# Patient Record
Sex: FEMALE | Race: WHITE | NOT HISPANIC OR LATINO
[De-identification: names, ages, dates, MRNs, and addresses within clinical notes are randomized per-mention and may not be internally consistent; named-entity substitution may affect disease eponyms.]

---

## 2017-03-16 ENCOUNTER — APPOINTMENT (OUTPATIENT)
Dept: PULMONOLOGY | Facility: CLINIC | Age: 57
End: 2017-03-16

## 2017-03-16 VITALS
OXYGEN SATURATION: 94 % | HEIGHT: 62.5 IN | SYSTOLIC BLOOD PRESSURE: 124 MMHG | BODY MASS INDEX: 24.84 KG/M2 | DIASTOLIC BLOOD PRESSURE: 78 MMHG | HEART RATE: 78 BPM

## 2017-03-16 VITALS — WEIGHT: 138 LBS | BODY MASS INDEX: 24.84 KG/M2

## 2017-03-16 DIAGNOSIS — R91.8 OTHER NONSPECIFIC ABNORMAL FINDING OF LUNG FIELD: ICD-10-CM

## 2017-03-16 RX ORDER — AZITHROMYCIN 250 MG/1
250 TABLET, FILM COATED ORAL
Qty: 6 | Refills: 0 | Status: DISCONTINUED | COMMUNITY
Start: 2016-11-28 | End: 2017-03-16

## 2017-03-16 RX ORDER — VALSARTAN 160 MG/1
160 TABLET, COATED ORAL
Qty: 90 | Refills: 0 | Status: DISCONTINUED | COMMUNITY
Start: 2016-11-22 | End: 2017-03-16

## 2017-03-16 RX ORDER — BETAMETHASONE DIPROPIONATE 0.5 MG/G
0.05 OINTMENT, AUGMENTED TOPICAL
Qty: 50 | Refills: 0 | Status: DISCONTINUED | COMMUNITY
Start: 2017-01-26 | End: 2017-03-16

## 2017-04-13 ENCOUNTER — OUTPATIENT (OUTPATIENT)
Dept: OUTPATIENT SERVICES | Facility: HOSPITAL | Age: 57
LOS: 1 days | End: 2017-04-13
Payer: COMMERCIAL

## 2017-04-13 DIAGNOSIS — Z01.818 ENCOUNTER FOR OTHER PREPROCEDURAL EXAMINATION: ICD-10-CM

## 2017-04-13 LAB
ANION GAP SERPL CALC-SCNC: 13 MMOL/L — SIGNIFICANT CHANGE UP (ref 5–17)
BASOPHILS # BLD AUTO: 0 K/UL — SIGNIFICANT CHANGE UP (ref 0–0.2)
BASOPHILS NFR BLD AUTO: 1 % — SIGNIFICANT CHANGE UP (ref 0–2)
BUN SERPL-MCNC: 15 MG/DL — SIGNIFICANT CHANGE UP (ref 8–20)
CALCIUM SERPL-MCNC: 9.5 MG/DL — SIGNIFICANT CHANGE UP (ref 8.6–10.2)
CHLORIDE SERPL-SCNC: 104 MMOL/L — SIGNIFICANT CHANGE UP (ref 98–107)
CO2 SERPL-SCNC: 24 MMOL/L — SIGNIFICANT CHANGE UP (ref 22–29)
CREAT SERPL-MCNC: 0.73 MG/DL — SIGNIFICANT CHANGE UP (ref 0.5–1.3)
EOSINOPHIL # BLD AUTO: 0.1 K/UL — SIGNIFICANT CHANGE UP (ref 0–0.5)
EOSINOPHIL NFR BLD AUTO: 3 % — SIGNIFICANT CHANGE UP (ref 0–5)
GLUCOSE SERPL-MCNC: 108 MG/DL — SIGNIFICANT CHANGE UP (ref 70–115)
HCT VFR BLD CALC: 33.9 % — LOW (ref 37–47)
HGB BLD-MCNC: 11 G/DL — LOW (ref 12–16)
LYMPHOCYTES # BLD AUTO: 0.8 K/UL — LOW (ref 1–4.8)
LYMPHOCYTES # BLD AUTO: 17 % — LOW (ref 20–55)
MCHC RBC-ENTMCNC: 30.2 PG — SIGNIFICANT CHANGE UP (ref 27–31)
MCHC RBC-ENTMCNC: 32.4 G/DL — SIGNIFICANT CHANGE UP (ref 32–36)
MCV RBC AUTO: 93.1 FL — SIGNIFICANT CHANGE UP (ref 81–99)
MONOCYTES # BLD AUTO: 0.4 K/UL — SIGNIFICANT CHANGE UP (ref 0–0.8)
MONOCYTES NFR BLD AUTO: 1 % — LOW (ref 3–10)
MYELOCYTES NFR BLD: 1 % — HIGH (ref 0–0)
NEUTROPHILS # BLD AUTO: 3.8 K/UL — SIGNIFICANT CHANGE UP (ref 1.8–8)
NEUTROPHILS NFR BLD AUTO: 77 % — HIGH (ref 37–73)
PLAT MORPH BLD: NORMAL — SIGNIFICANT CHANGE UP
PLATELET # BLD AUTO: 262 K/UL — SIGNIFICANT CHANGE UP (ref 150–400)
POTASSIUM SERPL-MCNC: 4.4 MMOL/L — SIGNIFICANT CHANGE UP (ref 3.5–5.3)
POTASSIUM SERPL-SCNC: 4.4 MMOL/L — SIGNIFICANT CHANGE UP (ref 3.5–5.3)
RBC # BLD: 3.64 M/UL — LOW (ref 4.4–5.2)
RBC # FLD: 13.1 % — SIGNIFICANT CHANGE UP (ref 11–15.6)
RBC BLD AUTO: NORMAL — SIGNIFICANT CHANGE UP
SODIUM SERPL-SCNC: 141 MMOL/L — SIGNIFICANT CHANGE UP (ref 135–145)
WBC # BLD: 5 K/UL — SIGNIFICANT CHANGE UP (ref 4.8–10.8)
WBC # FLD AUTO: 5 K/UL — SIGNIFICANT CHANGE UP (ref 4.8–10.8)

## 2017-04-13 PROCEDURE — 80048 BASIC METABOLIC PNL TOTAL CA: CPT

## 2017-04-13 PROCEDURE — G0463: CPT

## 2017-04-13 PROCEDURE — 85027 COMPLETE CBC AUTOMATED: CPT

## 2017-04-14 DIAGNOSIS — Z01.818 ENCOUNTER FOR OTHER PREPROCEDURAL EXAMINATION: ICD-10-CM

## 2017-04-14 DIAGNOSIS — M79.673 PAIN IN UNSPECIFIED FOOT: ICD-10-CM

## 2017-07-06 ENCOUNTER — RX RENEWAL (OUTPATIENT)
Age: 57
End: 2017-07-06

## 2017-07-07 ENCOUNTER — MEDICATION RENEWAL (OUTPATIENT)
Age: 57
End: 2017-07-07

## 2017-08-24 ENCOUNTER — APPOINTMENT (OUTPATIENT)
Dept: FAMILY MEDICINE | Facility: CLINIC | Age: 57
End: 2017-08-24
Payer: COMMERCIAL

## 2017-08-24 ENCOUNTER — NON-APPOINTMENT (OUTPATIENT)
Age: 57
End: 2017-08-24

## 2017-08-24 VITALS
HEIGHT: 62 IN | BODY MASS INDEX: 26.13 KG/M2 | WEIGHT: 142 LBS | HEART RATE: 80 BPM | DIASTOLIC BLOOD PRESSURE: 78 MMHG | SYSTOLIC BLOOD PRESSURE: 118 MMHG

## 2017-08-24 DIAGNOSIS — Z83.42 FAMILY HISTORY OF FAMILIAL HYPERCHOLESTEROLEMIA: ICD-10-CM

## 2017-08-24 DIAGNOSIS — Z82.49 FAMILY HISTORY OF ISCHEMIC HEART DISEASE AND OTHER DISEASES OF THE CIRCULATORY SYSTEM: ICD-10-CM

## 2017-08-24 DIAGNOSIS — Z83.3 FAMILY HISTORY OF DIABETES MELLITUS: ICD-10-CM

## 2017-08-24 DIAGNOSIS — Z82.5 FAMILY HISTORY OF ASTHMA AND OTHER CHRONIC LOWER RESPIRATORY DISEASES: ICD-10-CM

## 2017-08-24 PROCEDURE — 99386 PREV VISIT NEW AGE 40-64: CPT | Mod: 25

## 2017-08-24 PROCEDURE — 93000 ELECTROCARDIOGRAM COMPLETE: CPT

## 2017-08-24 PROCEDURE — 99214 OFFICE O/P EST MOD 30 MIN: CPT | Mod: 25

## 2017-08-28 ENCOUNTER — LABORATORY RESULT (OUTPATIENT)
Age: 57
End: 2017-08-28

## 2017-09-13 ENCOUNTER — MEDICATION RENEWAL (OUTPATIENT)
Age: 57
End: 2017-09-13

## 2017-09-18 ENCOUNTER — APPOINTMENT (OUTPATIENT)
Dept: FAMILY MEDICINE | Facility: CLINIC | Age: 57
End: 2017-09-18
Payer: COMMERCIAL

## 2017-09-18 VITALS
WEIGHT: 138 LBS | OXYGEN SATURATION: 98 % | DIASTOLIC BLOOD PRESSURE: 80 MMHG | SYSTOLIC BLOOD PRESSURE: 120 MMHG | TEMPERATURE: 98.7 F | HEART RATE: 72 BPM | BODY MASS INDEX: 25.24 KG/M2

## 2017-09-18 LAB
25(OH)D3 SERPL-MCNC: 28 NG/ML
ALBUMIN SERPL ELPH-MCNC: 4.1 G/DL
ALP BLD-CCNC: 79 U/L
ALT SERPL-CCNC: 28 U/L
ANION GAP SERPL CALC-SCNC: 16 MMOL/L
APPEARANCE: CLEAR
AST SERPL-CCNC: 48 U/L
BASOPHILS # BLD AUTO: 0.02 K/UL
BASOPHILS NFR BLD AUTO: 0.4 %
BILIRUB SERPL-MCNC: 0.3 MG/DL
BILIRUBIN URINE: NEGATIVE
BLOOD URINE: NEGATIVE
BUN SERPL-MCNC: 16 MG/DL
CALCIUM SERPL-MCNC: 9.3 MG/DL
CHLORIDE SERPL-SCNC: 103 MMOL/L
CHOLEST SERPL-MCNC: 203 MG/DL
CHOLEST/HDLC SERPL: 4.3 RATIO
CO2 SERPL-SCNC: 22 MMOL/L
COLOR: YELLOW
CREAT SERPL-MCNC: 0.91 MG/DL
EOSINOPHIL # BLD AUTO: 0.15 K/UL
EOSINOPHIL NFR BLD AUTO: 3.3 %
GLUCOSE QUALITATIVE U: NORMAL MG/DL
GLUCOSE SERPL-MCNC: 85 MG/DL
HCT VFR BLD CALC: 37.4 %
HDLC SERPL-MCNC: 47 MG/DL
HEMOCCULT STL QL IA: NEGATIVE
HGB BLD-MCNC: 11.9 G/DL
IMM GRANULOCYTES NFR BLD AUTO: 0.4 %
KETONES URINE: NEGATIVE
LDLC SERPL CALC-MCNC: 93 MG/DL
LEUKOCYTE ESTERASE URINE: ABNORMAL
LYMPHOCYTES # BLD AUTO: 0.97 K/UL
LYMPHOCYTES NFR BLD AUTO: 21.1 %
MAN DIFF?: NORMAL
MCHC RBC-ENTMCNC: 29.5 PG
MCHC RBC-ENTMCNC: 31.8 GM/DL
MCV RBC AUTO: 92.6 FL
MONOCYTES # BLD AUTO: 0.46 K/UL
MONOCYTES NFR BLD AUTO: 10 %
NEUTROPHILS # BLD AUTO: 2.98 K/UL
NEUTROPHILS NFR BLD AUTO: 64.8 %
NITRITE URINE: NEGATIVE
PH URINE: 6
PLATELET # BLD AUTO: 225 K/UL
POTASSIUM SERPL-SCNC: 4.3 MMOL/L
PROT SERPL-MCNC: 7.4 G/DL
PROTEIN URINE: NEGATIVE MG/DL
RBC # BLD: 4.04 M/UL
RBC # FLD: 14 %
SODIUM SERPL-SCNC: 141 MMOL/L
SPECIFIC GRAVITY URINE: 1.01
T4 FREE SERPL-MCNC: 0.9 NG/DL
TRIGL SERPL-MCNC: 316 MG/DL
TSH SERPL-ACNC: 3.21 UIU/ML
UROBILINOGEN URINE: NORMAL MG/DL
WBC # FLD AUTO: 4.6 K/UL

## 2017-09-18 PROCEDURE — G0008: CPT

## 2017-09-18 PROCEDURE — 99214 OFFICE O/P EST MOD 30 MIN: CPT | Mod: 25

## 2017-09-18 PROCEDURE — 90686 IIV4 VACC NO PRSV 0.5 ML IM: CPT

## 2017-10-04 ENCOUNTER — APPOINTMENT (OUTPATIENT)
Dept: PULMONOLOGY | Facility: CLINIC | Age: 57
End: 2017-10-04
Payer: COMMERCIAL

## 2017-10-04 VITALS — OXYGEN SATURATION: 97 % | DIASTOLIC BLOOD PRESSURE: 78 MMHG | HEART RATE: 88 BPM | SYSTOLIC BLOOD PRESSURE: 130 MMHG

## 2017-10-04 VITALS — WEIGHT: 138 LBS | BODY MASS INDEX: 25.24 KG/M2

## 2017-10-04 PROCEDURE — 94010 BREATHING CAPACITY TEST: CPT

## 2017-10-04 PROCEDURE — 99214 OFFICE O/P EST MOD 30 MIN: CPT | Mod: 25

## 2017-10-24 ENCOUNTER — OTHER (OUTPATIENT)
Age: 57
End: 2017-10-24

## 2017-10-24 DIAGNOSIS — R92.8 OTHER ABNORMAL AND INCONCLUSIVE FINDINGS ON DIAGNOSTIC IMAGING OF BREAST: ICD-10-CM

## 2018-01-05 LAB
ALBUMIN SERPL ELPH-MCNC: 4 G/DL
ALP BLD-CCNC: 104 U/L
ALT SERPL-CCNC: 22 U/L
AST SERPL-CCNC: 34 U/L
BILIRUB DIRECT SERPL-MCNC: 0.1 MG/DL
BILIRUB INDIRECT SERPL-MCNC: 0.3 MG/DL
BILIRUB SERPL-MCNC: 0.4 MG/DL
HAV IGM SER QL: NONREACTIVE
HBV CORE IGM SER QL: NONREACTIVE
HBV SURFACE AG SER QL: NONREACTIVE
HCV AB SER QL: NONREACTIVE
HCV S/CO RATIO: 0.14 S/CO
PROT SERPL-MCNC: 6.7 G/DL

## 2018-02-14 ENCOUNTER — MEDICATION RENEWAL (OUTPATIENT)
Age: 58
End: 2018-02-14

## 2018-02-20 ENCOUNTER — MEDICATION RENEWAL (OUTPATIENT)
Age: 58
End: 2018-02-20

## 2018-03-19 ENCOUNTER — APPOINTMENT (OUTPATIENT)
Dept: FAMILY MEDICINE | Facility: CLINIC | Age: 58
End: 2018-03-19
Payer: COMMERCIAL

## 2018-03-19 VITALS
HEIGHT: 64 IN | BODY MASS INDEX: 23.47 KG/M2 | HEART RATE: 89 BPM | DIASTOLIC BLOOD PRESSURE: 82 MMHG | SYSTOLIC BLOOD PRESSURE: 130 MMHG | WEIGHT: 137.5 LBS

## 2018-03-19 PROCEDURE — 99214 OFFICE O/P EST MOD 30 MIN: CPT

## 2018-03-20 LAB
ALBUMIN SERPL ELPH-MCNC: 4.5 G/DL
ALP BLD-CCNC: 100 U/L
ALT SERPL-CCNC: 19 U/L
ANION GAP SERPL CALC-SCNC: 17 MMOL/L
AST SERPL-CCNC: 27 U/L
BILIRUB SERPL-MCNC: 0.4 MG/DL
BUN SERPL-MCNC: 23 MG/DL
CALCIUM SERPL-MCNC: 9.7 MG/DL
CHLORIDE SERPL-SCNC: 102 MMOL/L
CO2 SERPL-SCNC: 22 MMOL/L
CREAT SERPL-MCNC: 0.86 MG/DL
GLUCOSE SERPL-MCNC: 95 MG/DL
POTASSIUM SERPL-SCNC: 4.5 MMOL/L
PROT SERPL-MCNC: 7.1 G/DL
SODIUM SERPL-SCNC: 141 MMOL/L

## 2018-04-03 ENCOUNTER — OTHER (OUTPATIENT)
Age: 58
End: 2018-04-03

## 2018-04-03 LAB
AMYLASE/CREAT SERPL: 62 U/L
BACTERIA STL CULT: NORMAL
BASOPHILS # BLD AUTO: 0.01 K/UL
BASOPHILS NFR BLD AUTO: 0.2 %
C DIFF TOX GENS STL QL NAA+PROBE: NORMAL
CDIFF BY PCR: NOT DETECTED
DEPRECATED O AND P PREP STL: ABNORMAL
ENDOMYSIUM IGA SER QL: NEGATIVE
ENDOMYSIUM IGA TITR SER: NORMAL
EOSINOPHIL # BLD AUTO: 0.08 K/UL
EOSINOPHIL NFR BLD AUTO: 1.3 %
GLIADIN IGA SER QL: <5 UNITS
GLIADIN IGG SER QL: <5 UNITS
GLIADIN PEPTIDE IGA SER-ACNC: NEGATIVE
GLIADIN PEPTIDE IGG SER-ACNC: NEGATIVE
HCT VFR BLD CALC: 35.8 %
HEMOCCULT STL QL IA: NEGATIVE
HGB BLD-MCNC: 11.6 G/DL
IMM GRANULOCYTES NFR BLD AUTO: 0.2 %
LPL SERPL-CCNC: 65 U/L
LYMPHOCYTES # BLD AUTO: 1 K/UL
LYMPHOCYTES NFR BLD AUTO: 16.4 %
MAN DIFF?: NORMAL
MCHC RBC-ENTMCNC: 30 PG
MCHC RBC-ENTMCNC: 32.4 GM/DL
MCV RBC AUTO: 92.5 FL
MONOCYTES # BLD AUTO: 0.52 K/UL
MONOCYTES NFR BLD AUTO: 8.5 %
NEUTROPHILS # BLD AUTO: 4.47 K/UL
NEUTROPHILS NFR BLD AUTO: 73.4 %
PLATELET # BLD AUTO: 215 K/UL
RBC # BLD: 3.87 M/UL
RBC # FLD: 13 %
TTG IGA SER IA-ACNC: <5 UNITS
TTG IGA SER-ACNC: NEGATIVE
TTG IGG SER IA-ACNC: <5 UNITS
TTG IGG SER IA-ACNC: NEGATIVE
WBC # FLD AUTO: 6.09 K/UL

## 2018-04-26 ENCOUNTER — APPOINTMENT (OUTPATIENT)
Dept: GASTROENTEROLOGY | Facility: CLINIC | Age: 58
End: 2018-04-26
Payer: COMMERCIAL

## 2018-04-26 VITALS
DIASTOLIC BLOOD PRESSURE: 92 MMHG | BODY MASS INDEX: 23.39 KG/M2 | RESPIRATION RATE: 16 BRPM | OXYGEN SATURATION: 98 % | SYSTOLIC BLOOD PRESSURE: 153 MMHG | WEIGHT: 137 LBS | HEIGHT: 64 IN | HEART RATE: 92 BPM

## 2018-04-26 PROCEDURE — 99204 OFFICE O/P NEW MOD 45 MIN: CPT

## 2018-04-26 PROCEDURE — 82270 OCCULT BLOOD FECES: CPT

## 2018-05-31 ENCOUNTER — APPOINTMENT (OUTPATIENT)
Dept: PULMONOLOGY | Facility: CLINIC | Age: 58
End: 2018-05-31
Payer: COMMERCIAL

## 2018-05-31 VITALS
WEIGHT: 134 LBS | OXYGEN SATURATION: 98 % | SYSTOLIC BLOOD PRESSURE: 120 MMHG | BODY MASS INDEX: 22.88 KG/M2 | HEART RATE: 91 BPM | DIASTOLIC BLOOD PRESSURE: 80 MMHG | HEIGHT: 64 IN

## 2018-05-31 PROCEDURE — 99215 OFFICE O/P EST HI 40 MIN: CPT | Mod: 25

## 2018-06-14 ENCOUNTER — APPOINTMENT (OUTPATIENT)
Dept: GASTROENTEROLOGY | Facility: GI CENTER | Age: 58
End: 2018-06-14
Payer: COMMERCIAL

## 2018-06-14 ENCOUNTER — RESULT REVIEW (OUTPATIENT)
Age: 58
End: 2018-06-14

## 2018-06-14 ENCOUNTER — OUTPATIENT (OUTPATIENT)
Dept: OUTPATIENT SERVICES | Facility: HOSPITAL | Age: 58
LOS: 1 days | End: 2018-06-14
Payer: COMMERCIAL

## 2018-06-14 DIAGNOSIS — Z86.010 PERSONAL HISTORY OF COLONIC POLYPS: ICD-10-CM

## 2018-06-14 PROCEDURE — 45380 COLONOSCOPY AND BIOPSY: CPT

## 2018-06-14 PROCEDURE — 88305 TISSUE EXAM BY PATHOLOGIST: CPT | Mod: 26

## 2018-06-14 PROCEDURE — 88305 TISSUE EXAM BY PATHOLOGIST: CPT

## 2018-06-18 LAB — SURGICAL PATHOLOGY FINAL REPORT - CH: SIGNIFICANT CHANGE UP

## 2018-06-27 ENCOUNTER — APPOINTMENT (OUTPATIENT)
Dept: FAMILY MEDICINE | Facility: CLINIC | Age: 58
End: 2018-06-27
Payer: COMMERCIAL

## 2018-06-27 VITALS
HEART RATE: 89 BPM | SYSTOLIC BLOOD PRESSURE: 130 MMHG | TEMPERATURE: 98.3 F | HEIGHT: 64 IN | BODY MASS INDEX: 22.41 KG/M2 | OXYGEN SATURATION: 98 % | WEIGHT: 131.25 LBS | DIASTOLIC BLOOD PRESSURE: 76 MMHG

## 2018-06-27 PROCEDURE — 99213 OFFICE O/P EST LOW 20 MIN: CPT

## 2018-06-30 NOTE — PLAN
[FreeTextEntry1] : LIKELY VIRAL\par SUPPORTIVE CARE: REST, FLUIDS, STEAM\par NASAL SPRAY - REVIEWED USE\par BRAT DIET - TO ADVANCE AS TOLERATED\par CALL WITH ANY QUESTIONS, CONCERNS OR CHANGES OR IF SYMPTOMS PERSIST/WORSEN\par FOLLOW-UP FOR ROUTINE CARE\par

## 2018-06-30 NOTE — PHYSICAL EXAM
[No Acute Distress] : no acute distress [Well Nourished] : well nourished [Well-Appearing] : well-appearing [Normal Outer Ear/Nose] : the outer ears and nose were normal in appearance [Normal Oropharynx] : the oropharynx was normal [Normal TMs] : both tympanic membranes were normal [Supple] : supple [No Lymphadenopathy] : no lymphadenopathy [No Respiratory Distress] : no respiratory distress  [Clear to Auscultation] : lungs were clear to auscultation bilaterally [No Accessory Muscle Use] : no accessory muscle use [Normal Rate] : normal rate  [Regular Rhythm] : with a regular rhythm [Normal S1, S2] : normal S1 and S2 [Soft] : abdomen soft [Non Tender] : non-tender [Normal Bowel Sounds] : normal bowel sounds

## 2018-06-30 NOTE — HISTORY OF PRESENT ILLNESS
[FreeTextEntry8] : PRESENTING FOR ACUTE VISIT.  HAS HAD SYMPTOMS FOR THE LAST COUPLE DAYS.  CONGESTION AND POSTNASAL DRIP.  NO SORE THROAT.  DIARRHEA.  COUGH YESTERDAY BETTER TODAY.  NO ABDOMINAL PAIN.  NO N/V.  NO BLOOD IN STOOL.  NO FEVER.  NO SHORTNESS OF BREATH OR WHEEZING.  NO CHEST PAIN OR SYNCOPE.  NO KNOWN SICK CONTACTS.  TODAY TOOK PEPTOBISMOL.  IS NOT WORSENING.  NO OTHER COMPLAINTS TODAY.

## 2018-06-30 NOTE — REVIEW OF SYSTEMS
[Fatigue] : fatigue [Nasal Discharge] : nasal discharge [Postnasal Drip] : postnasal drip [Diarrhea] : diarrhea [Fever] : no fever [Earache] : no earache [Chest Pain] : no chest pain [Palpitations] : no palpitations [Shortness Of Breath] : no shortness of breath [Wheezing] : no wheezing

## 2018-07-09 ENCOUNTER — APPOINTMENT (OUTPATIENT)
Dept: SURGERY | Facility: CLINIC | Age: 58
End: 2018-07-09
Payer: COMMERCIAL

## 2018-07-09 VITALS
TEMPERATURE: 98.2 F | DIASTOLIC BLOOD PRESSURE: 94 MMHG | OXYGEN SATURATION: 99 % | HEART RATE: 75 BPM | SYSTOLIC BLOOD PRESSURE: 164 MMHG | HEIGHT: 64 IN | BODY MASS INDEX: 22.88 KG/M2 | WEIGHT: 134 LBS

## 2018-07-09 PROCEDURE — 99244 OFF/OP CNSLTJ NEW/EST MOD 40: CPT

## 2018-08-02 ENCOUNTER — MEDICATION RENEWAL (OUTPATIENT)
Age: 58
End: 2018-08-02

## 2018-08-07 ENCOUNTER — RX RENEWAL (OUTPATIENT)
Age: 58
End: 2018-08-07

## 2018-08-14 ENCOUNTER — RX RENEWAL (OUTPATIENT)
Age: 58
End: 2018-08-14

## 2018-08-27 ENCOUNTER — OUTPATIENT (OUTPATIENT)
Dept: OUTPATIENT SERVICES | Facility: HOSPITAL | Age: 58
LOS: 1 days | End: 2018-08-27
Payer: COMMERCIAL

## 2018-08-27 DIAGNOSIS — Z01.818 ENCOUNTER FOR OTHER PREPROCEDURAL EXAMINATION: ICD-10-CM

## 2018-08-27 LAB
ANION GAP SERPL CALC-SCNC: 15 MMOL/L — SIGNIFICANT CHANGE UP (ref 5–17)
BASOPHILS # BLD AUTO: 0 K/UL — SIGNIFICANT CHANGE UP (ref 0–0.2)
BASOPHILS NFR BLD AUTO: 0.6 % — SIGNIFICANT CHANGE UP (ref 0–2)
BUN SERPL-MCNC: 22 MG/DL — HIGH (ref 8–20)
CALCIUM SERPL-MCNC: 9.9 MG/DL — SIGNIFICANT CHANGE UP (ref 8.6–10.2)
CHLORIDE SERPL-SCNC: 100 MMOL/L — SIGNIFICANT CHANGE UP (ref 98–107)
CO2 SERPL-SCNC: 23 MMOL/L — SIGNIFICANT CHANGE UP (ref 22–29)
CREAT SERPL-MCNC: 0.95 MG/DL — SIGNIFICANT CHANGE UP (ref 0.5–1.3)
EOSINOPHIL # BLD AUTO: 0.1 K/UL — SIGNIFICANT CHANGE UP (ref 0–0.5)
EOSINOPHIL NFR BLD AUTO: 2.6 % — SIGNIFICANT CHANGE UP (ref 0–5)
GLUCOSE SERPL-MCNC: 100 MG/DL — SIGNIFICANT CHANGE UP (ref 70–115)
HCT VFR BLD CALC: 35.7 % — LOW (ref 37–47)
HGB BLD-MCNC: 11.5 G/DL — LOW (ref 12–16)
LYMPHOCYTES # BLD AUTO: 0.8 K/UL — LOW (ref 1–4.8)
LYMPHOCYTES # BLD AUTO: 23.6 % — SIGNIFICANT CHANGE UP (ref 20–55)
MCHC RBC-ENTMCNC: 30 PG — SIGNIFICANT CHANGE UP (ref 27–31)
MCHC RBC-ENTMCNC: 32.2 G/DL — SIGNIFICANT CHANGE UP (ref 32–36)
MCV RBC AUTO: 93.2 FL — SIGNIFICANT CHANGE UP (ref 81–99)
MONOCYTES # BLD AUTO: 0.4 K/UL — SIGNIFICANT CHANGE UP (ref 0–0.8)
MONOCYTES NFR BLD AUTO: 12 % — HIGH (ref 3–10)
NEUTROPHILS # BLD AUTO: 2.1 K/UL — SIGNIFICANT CHANGE UP (ref 1.8–8)
NEUTROPHILS NFR BLD AUTO: 60.9 % — SIGNIFICANT CHANGE UP (ref 37–73)
PLAT MORPH BLD: NORMAL — SIGNIFICANT CHANGE UP
PLATELET # BLD AUTO: 192 K/UL — SIGNIFICANT CHANGE UP (ref 150–400)
POTASSIUM SERPL-MCNC: 4.5 MMOL/L — SIGNIFICANT CHANGE UP (ref 3.5–5.3)
POTASSIUM SERPL-SCNC: 4.5 MMOL/L — SIGNIFICANT CHANGE UP (ref 3.5–5.3)
RBC # BLD: 3.83 M/UL — LOW (ref 4.4–5.2)
RBC # FLD: 13 % — SIGNIFICANT CHANGE UP (ref 11–15.6)
RBC BLD AUTO: NORMAL — SIGNIFICANT CHANGE UP
SODIUM SERPL-SCNC: 138 MMOL/L — SIGNIFICANT CHANGE UP (ref 135–145)
WBC # BLD: 3.4 K/UL — LOW (ref 4.8–10.8)
WBC # FLD AUTO: 3.4 K/UL — LOW (ref 4.8–10.8)

## 2018-08-27 PROCEDURE — 93010 ELECTROCARDIOGRAM REPORT: CPT

## 2018-08-27 PROCEDURE — G0463: CPT

## 2018-08-27 PROCEDURE — 36415 COLL VENOUS BLD VENIPUNCTURE: CPT

## 2018-08-27 PROCEDURE — 85027 COMPLETE CBC AUTOMATED: CPT

## 2018-08-27 PROCEDURE — 80048 BASIC METABOLIC PNL TOTAL CA: CPT

## 2018-08-27 PROCEDURE — 93005 ELECTROCARDIOGRAM TRACING: CPT

## 2018-08-30 ENCOUNTER — APPOINTMENT (OUTPATIENT)
Dept: FAMILY MEDICINE | Facility: CLINIC | Age: 58
End: 2018-08-30
Payer: COMMERCIAL

## 2018-08-30 VITALS
SYSTOLIC BLOOD PRESSURE: 120 MMHG | HEART RATE: 83 BPM | WEIGHT: 133 LBS | OXYGEN SATURATION: 98 % | TEMPERATURE: 97.9 F | BODY MASS INDEX: 22.71 KG/M2 | HEIGHT: 64 IN | DIASTOLIC BLOOD PRESSURE: 60 MMHG

## 2018-08-30 DIAGNOSIS — Z86.19 PERSONAL HISTORY OF OTHER INFECTIOUS AND PARASITIC DISEASES: ICD-10-CM

## 2018-08-30 PROCEDURE — 99215 OFFICE O/P EST HI 40 MIN: CPT

## 2018-08-30 NOTE — PLAN
[FreeTextEntry1] : OPTIMIZED MEDICALLY FOR PROPOSED SURGICAL PROCEDURE \par \par PRE-OPERATIVE TESTING REVIEWED\par SEE CARDIOLOGY CLEARANCE\par STATES SHE WAS ALSO ADVISED TO HAVE PULMONARY CLEARANCE AS WELL \par GI/DVT PROPHYLAXIS PER SURGERY\par AVOIDANCE OF NSAIDS, VITAMINS AND ASPIRIN CONTAINING PRODUCTS PRIOR TO SURGERY\par RX FOR NEXT BREAST IMAGING\par RECHECK CBC POST PROCEDURE\par BLOOD PRESSURE CONTROLLED\par FLU VACCINE IN THE FALL RECOMMENDED\par ALSO DISCUSSED TDAP AND SHINGLES VACCINES\par ROUTINE GYN FOLLOW-UP\par CALL WITH ANY QUESTIONS, CONCERNS OR CHANGES PRIOR TO PROCEDURE\par SHEFALI TO CALL WITH UPDATED BLOOD PRESSURE MEDICATION.  STATES THAT IT WAS CHANGED BY CARDIOLOGY BUT WILL NEED TO CALL BACK WITH CORRECT NAME AND DOSAGE.\par SUPPORT PROVIDED\par FOLLOW-UP POST-OPERATIVELY

## 2018-08-30 NOTE — REVIEW OF SYSTEMS
[Fever] : no fever [Chills] : no chills [Fatigue] : no fatigue [Discharge] : no discharge [Pain] : no pain [Earache] : no earache [Nasal Discharge] : no nasal discharge [Sore Throat] : no sore throat [Chest Pain] : no chest pain [Palpitations] : no palpitations [Shortness Of Breath] : no shortness of breath [Cough] : no cough [Abdominal Pain] : no abdominal pain [Nausea] : no nausea [Diarrhea] : diarrhea [Vomiting] : no vomiting [Melena] : no melena [Dysuria] : no dysuria [Hematuria] : no hematuria [Joint Pain] : no joint pain [Muscle Pain] : no muscle pain [Itching] : no itching [Skin Rash] : no skin rash [Headache] : no headache [Dizziness] : no dizziness [Fainting] : no fainting [Suicidal] : not suicidal [Depression] : no depression [Easy Bleeding] : no easy bleeding [Easy Bruising] : no easy bruising

## 2018-08-30 NOTE — HISTORY OF PRESENT ILLNESS
[COPD] : COPD [(Patient denies any chest pain, claudication, dyspnea on exertion, orthopnea, palpitations or syncope)] : Patient denies any chest pain, claudication, dyspnea on exertion, orthopnea, palpitations or syncope [Aortic Stenosis] : no aortic stenosis [Atrial Fibrillation] : no atrial fibrillation [Coronary Artery Disease] : no coronary artery disease [Recent Myocardial Infarction] : no recent myocardial infarction [Implantable Device/Pacemaker] : no implantable device/pacemaker [Asthma] : no asthma [Sleep Apnea] : no sleep apnea [Smoker] : not a smoker [Family Member] : no family member with adverse anesthesia reaction/sudden death [Self] : no previous adverse anesthesia reaction [Chronic Anticoagulation] : no chronic anticoagulation [Chronic Kidney Disease] : no chronic kidney disease [Diabetes] : no diabetes [FreeTextEntry1] : UMBILICAL HERNIA REPAIR [FreeTextEntry2] : SEPTEMBER 4, 2018 [FreeTextEntry3] : DR. ARRIAZA [FreeTextEntry4] : PRESENTING FOR MEDICAL CLEARANCE FOR UPCOMING UMBILICAL HERNIA REPAIR SURGERY.  HAS HAD FOR YEARS.  SOMETIMES CAUSES DISCOMFORT.  FEELING WELL TODAY.  NO HISTORY OF MI, STROKE OR SEIZURE.  NO PERSONAL OR FAMILY HISTORY OF BLOOD DISORDERS.  DENIES EASY BLEEDING OR BRUISING.  IS ABLE TO WALK MULTIPLE BLOCKS AND GO UP MULTIPLE FLIGHTS OF STAIRS WITHOUT DIFFICULTY.   HAS HAD NO HEADACHE, DIZZINESS, CHEST PAIN, SHORTNESS OF BREATH, GI OR URINARY COMPLAINTS.  NO OTHER COMPLAINTS TODAY.  CHRONIC HISTORY OF HYPERTENSION, HYPERLIPIDEMIA, COPD, CAROTID DISEASE AND VITAMIN D INSUFFICIENCY.  BLOOD PRESSURE CONTROLLED ON CURRENT MEDICATIONS.  ON LIPITOR FOR CHOLESTEROL.  WILL BE DUE FOR 6 MONTH BREAST IMAGING FOLLOW-UP.  HAS NO BREAST COMPLAINTS.  NO PAIN, NIPPLE DISCHARGE, SKIN CHANGES OR LUMPS.  ADVISED TO SEE CARDIOLOGY AND PULMONOLOGY FOR CLEARANCE. [FreeTextEntry5] : HYPERTENSION, HYPERLIPDIEMIA, CAROTID ARTERY DISEASE [FreeTextEntry7] : SAW CARDIOLOGY DR. VELASCO FOR CLEARANCE\par WAS ADVISED SHE NEEDED PULMONOLOGY CLEARANCE AS WELL

## 2018-08-31 ENCOUNTER — APPOINTMENT (OUTPATIENT)
Dept: PULMONOLOGY | Facility: CLINIC | Age: 58
End: 2018-08-31
Payer: COMMERCIAL

## 2018-08-31 VITALS
RESPIRATION RATE: 16 BRPM | HEART RATE: 85 BPM | DIASTOLIC BLOOD PRESSURE: 72 MMHG | OXYGEN SATURATION: 97 % | WEIGHT: 132 LBS | SYSTOLIC BLOOD PRESSURE: 122 MMHG | BODY MASS INDEX: 22.66 KG/M2

## 2018-08-31 PROCEDURE — 99214 OFFICE O/P EST MOD 30 MIN: CPT | Mod: 25

## 2018-08-31 PROCEDURE — 94010 BREATHING CAPACITY TEST: CPT

## 2018-08-31 RX ORDER — SODIUM SULFATE, POTASSIUM SULFATE, MAGNESIUM SULFATE 17.5; 3.13; 1.6 G/ML; G/ML; G/ML
17.5-3.13-1.6 SOLUTION, CONCENTRATE ORAL
Qty: 1 | Refills: 0 | Status: DISCONTINUED | COMMUNITY
Start: 2018-04-26 | End: 2018-08-31

## 2018-08-31 RX ORDER — VALSARTAN 80 MG/1
80 TABLET, COATED ORAL DAILY
Qty: 90 | Refills: 1 | Status: DISCONTINUED | COMMUNITY
Start: 2016-12-09 | End: 2018-08-31

## 2018-09-04 PROCEDURE — 49585: CPT

## 2018-09-07 ENCOUNTER — APPOINTMENT (OUTPATIENT)
Dept: FAMILY MEDICINE | Facility: CLINIC | Age: 58
End: 2018-09-07

## 2018-09-10 ENCOUNTER — APPOINTMENT (OUTPATIENT)
Dept: SURGERY | Facility: CLINIC | Age: 58
End: 2018-09-10
Payer: COMMERCIAL

## 2018-09-10 VITALS
HEART RATE: 100 BPM | TEMPERATURE: 97.7 F | DIASTOLIC BLOOD PRESSURE: 90 MMHG | BODY MASS INDEX: 22.66 KG/M2 | SYSTOLIC BLOOD PRESSURE: 142 MMHG | OXYGEN SATURATION: 98 % | WEIGHT: 132 LBS

## 2018-09-10 PROCEDURE — 99024 POSTOP FOLLOW-UP VISIT: CPT

## 2018-09-28 ENCOUNTER — APPOINTMENT (OUTPATIENT)
Dept: SURGERY | Facility: CLINIC | Age: 58
End: 2018-09-28
Payer: COMMERCIAL

## 2018-09-28 VITALS
RESPIRATION RATE: 16 BRPM | SYSTOLIC BLOOD PRESSURE: 97 MMHG | TEMPERATURE: 98.5 F | OXYGEN SATURATION: 96 % | BODY MASS INDEX: 22.71 KG/M2 | HEIGHT: 64 IN | DIASTOLIC BLOOD PRESSURE: 60 MMHG | WEIGHT: 133 LBS | HEART RATE: 81 BPM

## 2018-09-28 VITALS
SYSTOLIC BLOOD PRESSURE: 97 MMHG | WEIGHT: 133 LBS | HEART RATE: 81 BPM | RESPIRATION RATE: 16 BRPM | BODY MASS INDEX: 22.71 KG/M2 | DIASTOLIC BLOOD PRESSURE: 60 MMHG | TEMPERATURE: 98.5 F | OXYGEN SATURATION: 96 % | HEIGHT: 64 IN

## 2018-09-28 PROCEDURE — 99024 POSTOP FOLLOW-UP VISIT: CPT

## 2018-10-03 ENCOUNTER — RX RENEWAL (OUTPATIENT)
Age: 58
End: 2018-10-03

## 2018-10-12 ENCOUNTER — TRANSCRIPTION ENCOUNTER (OUTPATIENT)
Age: 58
End: 2018-10-12

## 2018-12-05 ENCOUNTER — APPOINTMENT (OUTPATIENT)
Dept: PULMONOLOGY | Facility: CLINIC | Age: 58
End: 2018-12-05
Payer: COMMERCIAL

## 2018-12-05 VITALS — WEIGHT: 137 LBS | HEIGHT: 64 IN | BODY MASS INDEX: 23.39 KG/M2

## 2018-12-05 VITALS
HEART RATE: 78 BPM | OXYGEN SATURATION: 98 % | SYSTOLIC BLOOD PRESSURE: 122 MMHG | RESPIRATION RATE: 16 BRPM | DIASTOLIC BLOOD PRESSURE: 80 MMHG

## 2018-12-05 PROCEDURE — 94010 BREATHING CAPACITY TEST: CPT

## 2018-12-05 PROCEDURE — 99214 OFFICE O/P EST MOD 30 MIN: CPT | Mod: 25

## 2018-12-28 ENCOUNTER — OTHER (OUTPATIENT)
Age: 58
End: 2018-12-28

## 2019-03-03 ENCOUNTER — RX RENEWAL (OUTPATIENT)
Age: 59
End: 2019-03-03

## 2019-03-04 ENCOUNTER — APPOINTMENT (OUTPATIENT)
Dept: FAMILY MEDICINE | Facility: CLINIC | Age: 59
End: 2019-03-04
Payer: COMMERCIAL

## 2019-03-04 VITALS
DIASTOLIC BLOOD PRESSURE: 78 MMHG | HEART RATE: 80 BPM | HEIGHT: 64 IN | WEIGHT: 132 LBS | SYSTOLIC BLOOD PRESSURE: 120 MMHG | BODY MASS INDEX: 22.53 KG/M2

## 2019-03-04 DIAGNOSIS — R19.5 OTHER FECAL ABNORMALITIES: ICD-10-CM

## 2019-03-04 DIAGNOSIS — Z92.29 PERSONAL HISTORY OF OTHER DRUG THERAPY: ICD-10-CM

## 2019-03-04 DIAGNOSIS — R92.8 OTHER ABNORMAL AND INCONCLUSIVE FINDINGS ON DIAGNOSTIC IMAGING OF BREAST: ICD-10-CM

## 2019-03-04 PROCEDURE — 99214 OFFICE O/P EST MOD 30 MIN: CPT

## 2019-03-04 RX ORDER — OLMESARTAN MEDOXOMIL 5 MG/1
5 TABLET, FILM COATED ORAL DAILY
Qty: 90 | Refills: 1 | Status: DISCONTINUED | COMMUNITY
End: 2019-03-04

## 2019-03-10 PROBLEM — R92.8 ABNORMAL FINDING ON BREAST IMAGING: Status: ACTIVE | Noted: 2017-10-24

## 2019-03-10 PROBLEM — R19.5 LOOSE STOOLS: Status: RESOLVED | Noted: 2018-03-19 | Resolved: 2019-03-10

## 2019-03-10 PROBLEM — Z92.29 HISTORY OF INFLUENZA VACCINATION: Status: RESOLVED | Noted: 2017-09-18 | Resolved: 2019-03-10

## 2019-03-10 NOTE — ADDENDUM
[FreeTextEntry1] : I, Munira Jorge, acted solely as a scribe for Dr. Amanda Zuluaga on this date 3/4/19.

## 2019-03-10 NOTE — PHYSICAL EXAM
[No Acute Distress] : no acute distress [Well Nourished] : well nourished [Well-Appearing] : well-appearing [Supple] : supple [No Lymphadenopathy] : no lymphadenopathy [No Respiratory Distress] : no respiratory distress  [Clear to Auscultation] : lungs were clear to auscultation bilaterally [No Accessory Muscle Use] : no accessory muscle use [Normal Rate] : normal rate  [Regular Rhythm] : with a regular rhythm [Normal S1, S2] : normal S1 and S2 [No Murmur] : no murmur heard [Pedal Pulses Present] : the pedal pulses are present [No Edema] : there was no peripheral edema [Soft] : abdomen soft [Non Tender] : non-tender [Normal Bowel Sounds] : normal bowel sounds [No Joint Swelling] : no joint swelling [Grossly Normal Strength/Tone] : grossly normal strength/tone [Speech Grossly Normal] : speech grossly normal [Normal Mood] : the mood was normal [Normal Insight/Judgement] : insight and judgment were intact

## 2019-03-10 NOTE — HISTORY OF PRESENT ILLNESS
[FreeTextEntry1] : F/U HTN, HYPERLIPIDEMIA, VITAMIN D INSUFFICIENCY  [de-identified] : MS. CANTOR IS A VERY PLEASANT 59 YO PRESENTING FOR FOLLOW UP. S/P UMBILICAL HERNIA REPAIR. CHRONIC HISTORY OF HYPERTENSION, HYPERLIPIDEMIA, COPD, CAROTID DISEASE AND VITAMIN D INSUFFICIENCY. BLOOD PRESSURE CONTROLLED ON CURRENT MEDICATIONS. ON LIPITOR FOR CHOLESTEROL FOLLOWS WITH PULMONOLOGY FOR COPD. HAS HAD REPEAT BREAST IMAGING, ADVISED TO FOLLOW UP AGAIN. HAS NOT YET SEEN BREAST SURGEON. NO BREAST COMPLAINTS TODAY.

## 2019-03-10 NOTE — PLAN
[FreeTextEntry1] : RX FOR FOLLOW-UP BREAST IMAGING\par RE-ADVISED BREAST SURGEON EVALUATION - DR. ALMANZAR\par HEALTHY DIET AND LIFESTYLE MODIFICATIONS\par BLOOD PRESSURE CONTROLLED\par MONITOR LAB WORK INCLUDING LIPIDS AND VITAMIN D LEVELS\par FOLLOW-UP ALL SPECIALISTS AS DIRECTED \par ROUTINE GYN EXAMS\par CONSIDER SHINGRIX\par CALL WITH ANY QUESTIONS, CONCERNS OR CHANGES

## 2019-03-12 ENCOUNTER — LABORATORY RESULT (OUTPATIENT)
Age: 59
End: 2019-03-12

## 2019-03-18 ENCOUNTER — RX RENEWAL (OUTPATIENT)
Age: 59
End: 2019-03-18

## 2019-03-19 ENCOUNTER — TRANSCRIPTION ENCOUNTER (OUTPATIENT)
Age: 59
End: 2019-03-19

## 2019-03-27 LAB
25(OH)D3 SERPL-MCNC: 40.3 NG/ML
ALBUMIN SERPL ELPH-MCNC: 4.7 G/DL
ALP BLD-CCNC: 130 U/L
ALT SERPL-CCNC: 24 U/L
ANION GAP SERPL CALC-SCNC: 15 MMOL/L
APPEARANCE: CLEAR
AST SERPL-CCNC: 24 U/L
BILIRUB SERPL-MCNC: 0.4 MG/DL
BILIRUBIN URINE: NEGATIVE
BLOOD URINE: NEGATIVE
BUN SERPL-MCNC: 24 MG/DL
CALCIUM SERPL-MCNC: 9.9 MG/DL
CHLORIDE SERPL-SCNC: 105 MMOL/L
CHOLEST SERPL-MCNC: 191 MG/DL
CHOLEST/HDLC SERPL: 3.3 RATIO
CO2 SERPL-SCNC: 22 MMOL/L
COLOR: YELLOW
CREAT SERPL-MCNC: 1.04 MG/DL
GLUCOSE QUALITATIVE U: NEGATIVE
GLUCOSE SERPL-MCNC: 95 MG/DL
HDLC SERPL-MCNC: 58 MG/DL
KETONES URINE: NEGATIVE
LDLC SERPL CALC-MCNC: 91 MG/DL
LEUKOCYTE ESTERASE URINE: ABNORMAL
NITRITE URINE: NEGATIVE
PH URINE: 6
POTASSIUM SERPL-SCNC: 4 MMOL/L
PROT SERPL-MCNC: 7.3 G/DL
PROTEIN URINE: NORMAL
SODIUM SERPL-SCNC: 142 MMOL/L
SPECIFIC GRAVITY URINE: 1.02
T4 FREE SERPL-MCNC: 1.3 NG/DL
TRIGL SERPL-MCNC: 208 MG/DL
TSH SERPL-ACNC: 5.2 UIU/ML
UROBILINOGEN URINE: NORMAL

## 2019-03-28 ENCOUNTER — RESULT REVIEW (OUTPATIENT)
Age: 59
End: 2019-03-28

## 2019-03-28 LAB
BASOPHILS # BLD AUTO: 0.03 K/UL
BASOPHILS NFR BLD AUTO: 0.8 %
EOSINOPHIL # BLD AUTO: 0.12 K/UL
EOSINOPHIL NFR BLD AUTO: 3.1 %
HCT VFR BLD CALC: 37.4 %
HGB BLD-MCNC: 11.9 G/DL
IMM GRANULOCYTES NFR BLD AUTO: 0.3 %
LYMPHOCYTES # BLD AUTO: 0.97 K/UL
LYMPHOCYTES NFR BLD AUTO: 24.9 %
MAN DIFF?: NORMAL
MCHC RBC-ENTMCNC: 30.1 PG
MCHC RBC-ENTMCNC: 31.8 GM/DL
MCV RBC AUTO: 94.4 FL
MONOCYTES # BLD AUTO: 0.46 K/UL
MONOCYTES NFR BLD AUTO: 11.8 %
NEUTROPHILS # BLD AUTO: 2.31 K/UL
NEUTROPHILS NFR BLD AUTO: 59.1 %
PLATELET # BLD AUTO: 250 K/UL
RBC # BLD: 3.96 M/UL
RBC # FLD: 12.5 %
WBC # FLD AUTO: 3.9 K/UL

## 2019-04-16 ENCOUNTER — APPOINTMENT (OUTPATIENT)
Dept: FAMILY MEDICINE | Facility: CLINIC | Age: 59
End: 2019-04-16
Payer: COMMERCIAL

## 2019-04-16 VITALS
SYSTOLIC BLOOD PRESSURE: 142 MMHG | WEIGHT: 129 LBS | BODY MASS INDEX: 22.02 KG/M2 | HEIGHT: 64 IN | HEART RATE: 80 BPM | DIASTOLIC BLOOD PRESSURE: 82 MMHG

## 2019-04-16 VITALS — DIASTOLIC BLOOD PRESSURE: 64 MMHG | SYSTOLIC BLOOD PRESSURE: 134 MMHG

## 2019-04-16 PROCEDURE — 99214 OFFICE O/P EST MOD 30 MIN: CPT

## 2019-04-21 NOTE — ADDENDUM
[FreeTextEntry1] : I, Munira Jorge, acted solely as a scribe for Dr. Amanda Zuluaga on this date 4/16/19.

## 2019-04-21 NOTE — PHYSICAL EXAM
[No Acute Distress] : no acute distress [Well Nourished] : well nourished [Supple] : supple [Well-Appearing] : well-appearing [No Respiratory Distress] : no respiratory distress  [No Lymphadenopathy] : no lymphadenopathy [Clear to Auscultation] : lungs were clear to auscultation bilaterally [No Accessory Muscle Use] : no accessory muscle use [Normal Rate] : normal rate  [Normal S1, S2] : normal S1 and S2 [Regular Rhythm] : with a regular rhythm [No Murmur] : no murmur heard [Pedal Pulses Present] : the pedal pulses are present [No Edema] : there was no peripheral edema [Non Tender] : non-tender [Soft] : abdomen soft [Normal Bowel Sounds] : normal bowel sounds [No Joint Swelling] : no joint swelling [Grossly Normal Strength/Tone] : grossly normal strength/tone [Speech Grossly Normal] : speech grossly normal [Normal Mood] : the mood was normal [Normal Insight/Judgement] : insight and judgment were intact

## 2019-04-21 NOTE — PLAN
[FreeTextEntry1] : LABS REVIEWED \par REPEAT URINALYSIS \par MONITOR LABS FOR THYROID STUDIES\par CONTINUE ALL MEDICATION AS DIRECTED\par FOLLOW UP WITH ALL SPECIALISTS AS DIRECTED\par BLOOD PRESSURE CONTROLLED \par HEALTHY DIET AND LIFESTYLE MODIFICATIONS\par STILL NEEDS TO SEE BREAST SURGEON\par ROUTINE GYN FOLLOW-UP\par CALL WITH ANY QUESTIONS, CONCERNS OR CHANGES

## 2019-04-21 NOTE — HISTORY OF PRESENT ILLNESS
[FreeTextEntry1] : REVIEW LABS  [de-identified] : PATIENT PRESENTING FOR FOLLOW UP TO REVIEW LABS. HAS NOT YET SEEN BREAST SURGEON. SCHEDULED TO SEE DR. DELGADO OF GYNECOLOGY. SAW CARDIOLOGY IN NOVEMBER AND HAD CAROTID DOPPLER. CHRONIC HISTORY OF HYPERTENSION, HYPERLIPIDEMIA, COPD, CAROTID DISEASE AND VITAMIN D INSUFFICIENCY. BLOOD PRESSURE CONTROLLED ON CURRENT MEDICATIONS. ON LIPITOR FOR CHOLESTEROL. TAKING SUPPLEMENTAL VITAMIN D HAS HAD NO CHEST PAIN, SHORTNESS OF BREATH, HEADACHE, DIZZINESS, GI OR URINARY COMPLAINTS. NO OTHER COMPLAINTS TODAY.

## 2019-04-21 NOTE — COUNSELING
[Activity counseling provided] : activity [Healthy eating counseling provided] : healthy eating [Low Fat Diet] : Low fat diet [Low Salt Diet] : Low salt diet [Walking] : Walking [None] : None [Good understanding] : Patient has a good understanding of lifestyle changes and the steps needed to achieve self management goals

## 2019-05-06 ENCOUNTER — RX RENEWAL (OUTPATIENT)
Age: 59
End: 2019-05-06

## 2019-05-10 ENCOUNTER — RX RENEWAL (OUTPATIENT)
Age: 59
End: 2019-05-10

## 2019-05-14 ENCOUNTER — MEDICATION RENEWAL (OUTPATIENT)
Age: 59
End: 2019-05-14

## 2019-05-30 ENCOUNTER — RX RENEWAL (OUTPATIENT)
Age: 59
End: 2019-05-30

## 2019-05-31 ENCOUNTER — MEDICATION RENEWAL (OUTPATIENT)
Age: 59
End: 2019-05-31

## 2019-06-10 ENCOUNTER — OTHER (OUTPATIENT)
Age: 59
End: 2019-06-10

## 2019-06-12 ENCOUNTER — APPOINTMENT (OUTPATIENT)
Dept: PULMONOLOGY | Facility: CLINIC | Age: 59
End: 2019-06-12
Payer: COMMERCIAL

## 2019-06-12 VITALS — SYSTOLIC BLOOD PRESSURE: 122 MMHG | DIASTOLIC BLOOD PRESSURE: 62 MMHG | OXYGEN SATURATION: 98 % | HEART RATE: 87 BPM

## 2019-06-12 VITALS — WEIGHT: 126 LBS | BODY MASS INDEX: 21.63 KG/M2

## 2019-06-12 PROCEDURE — G0296 VISIT TO DETERM LDCT ELIG: CPT

## 2019-06-12 PROCEDURE — 99214 OFFICE O/P EST MOD 30 MIN: CPT | Mod: 25

## 2019-06-12 PROCEDURE — 94010 BREATHING CAPACITY TEST: CPT

## 2019-06-12 RX ORDER — FLUTICASONE PROPIONATE 50 UG/1
50 SPRAY, METERED NASAL DAILY
Qty: 1 | Refills: 0 | Status: DISCONTINUED | COMMUNITY
Start: 2018-06-27 | End: 2019-06-12

## 2019-06-12 NOTE — HISTORY OF PRESENT ILLNESS
[Doing Well] : doing well [None] : The patient is currently asymptomatic [Adherent] : the patient is adherent with ~his/her~ medication regimen [Tobacco Use] : ~He/She~ does not use tobacco [de-identified] : lung nodules [FreeTextEntry8] : 10 yrs tobacco free

## 2019-06-12 NOTE — CONSULT LETTER
[Dear  ___] : Dear  [unfilled], [Please see my note below.] : Please see my note below. [Consult Letter:] : I had the pleasure of evaluating your patient, [unfilled]. [Sincerely,] : Sincerely, [Lance Thomas MD] : Lance Thomas MD

## 2019-06-12 NOTE — PHYSICAL EXAM
[Normal Appearance] : normal appearance [General Appearance - Well Developed] : well developed [General Appearance - Well Nourished] : well nourished [Well Groomed] : well groomed [Normal Conjunctiva] : the conjunctiva exhibited no abnormalities [No Deformities] : no deformities [General Appearance - In No Acute Distress] : no acute distress [Eyelids - No Xanthelasma] : the eyelids demonstrated no xanthelasmas [Normal Oropharynx] : normal oropharynx [Neck Appearance] : the appearance of the neck was normal [Neck Cervical Mass (___cm)] : no neck mass was observed [Thyroid Diffuse Enlargement] : the thyroid was not enlarged [Jugular Venous Distention Increased] : there was no jugular-venous distention [Thyroid Nodule] : there were no palpable thyroid nodules [Heart Rate And Rhythm] : heart rate and rhythm were normal [Murmurs] : no murmurs present [Heart Sounds] : normal S1 and S2 [Respiration, Rhythm And Depth] : normal respiratory rhythm and effort [Exaggerated Use Of Accessory Muscles For Inspiration] : no accessory muscle use [Auscultation Breath Sounds / Voice Sounds] : lungs were clear to auscultation bilaterally [Abdomen Mass (___ Cm)] : no abdominal mass palpated [Abdomen Tenderness] : non-tender [Abdomen Soft] : soft [Abnormal Walk] : normal gait [Gait - Sufficient For Exercise Testing] : the gait was sufficient for exercise testing [Nail Clubbing] : no clubbing of the fingernails [Petechial Hemorrhages (___cm)] : no petechial hemorrhages [Cyanosis, Localized] : no localized cyanosis [Skin Color & Pigmentation] : normal skin color and pigmentation [] : no rash [No Venous Stasis] : no venous stasis [Skin Lesions] : no skin lesions [No Skin Ulcers] : no skin ulcer [No Xanthoma] : no  xanthoma was observed [Sensation] : the sensory exam was normal to light touch and pinprick [Deep Tendon Reflexes (DTR)] : deep tendon reflexes were 2+ and symmetric [No Focal Deficits] : no focal deficits

## 2019-06-12 NOTE — DISCUSSION/SUMMARY
[COPD] : chronic obstructive pulmonary disease [Stage II (Moderate)] : stage II (moderate) [Stable] : stable [Chest CT] : chest CT [FreeTextEntry1] : Due to the patient's history of prior tobacco use they fulfill criteria for low dose, lung cancer screening. This method was described to the patient with criteria for greater than 30 pack years of smoking, over the age of 55, and screening for 15 years following cessation of tobacco use.\par  [de-identified] : in july 2019 [Medication Changes Per Orders] : Medication changes are as documented in orders

## 2019-07-03 ENCOUNTER — RX RENEWAL (OUTPATIENT)
Age: 59
End: 2019-07-03

## 2019-10-03 ENCOUNTER — APPOINTMENT (OUTPATIENT)
Dept: FAMILY MEDICINE | Facility: CLINIC | Age: 59
End: 2019-10-03
Payer: COMMERCIAL

## 2019-10-03 VITALS
BODY MASS INDEX: 21.68 KG/M2 | HEART RATE: 76 BPM | WEIGHT: 127 LBS | SYSTOLIC BLOOD PRESSURE: 110 MMHG | HEIGHT: 64 IN | OXYGEN SATURATION: 97 % | DIASTOLIC BLOOD PRESSURE: 60 MMHG

## 2019-10-03 PROCEDURE — 99214 OFFICE O/P EST MOD 30 MIN: CPT

## 2019-10-06 NOTE — PHYSICAL EXAM
[No Acute Distress] : no acute distress [Well Nourished] : well nourished [No Lymphadenopathy] : no lymphadenopathy [Well-Appearing] : well-appearing [Supple] : supple [No Respiratory Distress] : no respiratory distress  [Clear to Auscultation] : lungs were clear to auscultation bilaterally [No Accessory Muscle Use] : no accessory muscle use [Normal Rate] : normal rate  [Normal S1, S2] : normal S1 and S2 [Regular Rhythm] : with a regular rhythm [No Murmur] : no murmur heard [Pedal Pulses Present] : the pedal pulses are present [Soft] : abdomen soft [No Edema] : there was no peripheral edema [Non Tender] : non-tender [Normal Bowel Sounds] : normal bowel sounds [No Joint Swelling] : no joint swelling [Memory Grossly Normal] : memory grossly normal [Speech Grossly Normal] : speech grossly normal [Grossly Normal Strength/Tone] : grossly normal strength/tone [Alert and Oriented x3] : oriented to person, place, and time [Normal Affect] : the affect was normal

## 2019-10-06 NOTE — PLAN
[FreeTextEntry1] : CHECK LAB WORK INCLUDING LIPIDS AND TFT'S\par BLOOD PRESSURE WELL CONTROLLED\par HEALTHY DIET AND LIFESTYLE MODIFICATIONS\par CONTINUE ALL MEDICATIONS AS DIRECTED\par FOLLOW-UP WITH ALL SPECIALISTS AS DIRECTED\par RE-ADVISED BREAST SURGERY EVALUATION\par CALL WITH ANY QUESTIONS, CONCERNS OR CHANGES \par FOLLOW-UP FOR CPE

## 2019-10-06 NOTE — HISTORY OF PRESENT ILLNESS
[FreeTextEntry1] : F/U HTN, CHOLESTEROL [de-identified] : MS. CANTOR IS A PLEASANT 60 YO PRESENTING FOR FOLLOW-UP. CHRONIC HISTORY OF HYPERTENSION, HYPERLIPIDEMIA, COPD, CAROTID DISEASE AND VITAMIN D INSUFFICIENCY. HAS HAD YEARLY FLU VACCINE. SCHEDULED TO SEE GYN.  BLOOD PRESSURE REMAINS WELL CONTROLLED ON OLMESARTAN.  ON LIPITOR AND GEMFIBROZIL FOR CHOLESTEROL.  TRYING TO KEEP A HEALTHY DIET AND REMAIN ACTIVE.  PRIOR ABNORMAL TFT'S.  DENIES SIGNS OR SYMPTOMS OF THYROID DISEASE.  HAS HAD NO HEADACHES, DIZZINESS, CHEST PAIN, SHORTNESS OF BREATH, GI OR URINARY COMPLAINTS. NO OTHER COMPLAINTS TODAY.

## 2019-10-06 NOTE — REVIEW OF SYSTEMS
[Fever] : no fever [Discharge] : no discharge [Fatigue] : no fatigue [Chills] : no chills [Pain] : no pain [Chest Pain] : no chest pain [Wheezing] : no wheezing [Palpitations] : no palpitations [Shortness Of Breath] : no shortness of breath [Lower Ext Edema] : no lower extremity edema [Nausea] : no nausea [Cough] : no cough [Abdominal Pain] : no abdominal pain [Vomiting] : no vomiting [Diarrhea] : diarrhea [Constipation] : no constipation [Hematuria] : no hematuria [Joint Pain] : no joint pain [Dysuria] : no dysuria [Muscle Weakness] : no muscle weakness [Muscle Pain] : no muscle pain [Headache] : no headache [Fainting] : no fainting [Dizziness] : no dizziness [Easy Bleeding] : no easy bleeding [Easy Bruising] : no easy bruising

## 2019-11-20 ENCOUNTER — RX RENEWAL (OUTPATIENT)
Age: 59
End: 2019-11-20

## 2019-11-24 LAB
ALBUMIN SERPL ELPH-MCNC: 4.2 G/DL
ALP BLD-CCNC: 115 U/L
ALT SERPL-CCNC: 55 U/L
ANION GAP SERPL CALC-SCNC: 14 MMOL/L
AST SERPL-CCNC: 43 U/L
BILIRUB SERPL-MCNC: 0.4 MG/DL
BUN SERPL-MCNC: 20 MG/DL
CALCIUM SERPL-MCNC: 9.5 MG/DL
CHLORIDE SERPL-SCNC: 107 MMOL/L
CHOLEST SERPL-MCNC: 194 MG/DL
CHOLEST/HDLC SERPL: 3.2 RATIO
CO2 SERPL-SCNC: 23 MMOL/L
CREAT SERPL-MCNC: 0.99 MG/DL
GLUCOSE SERPL-MCNC: 99 MG/DL
HDLC SERPL-MCNC: 61 MG/DL
LDLC SERPL CALC-MCNC: 88 MG/DL
POTASSIUM SERPL-SCNC: 4 MMOL/L
PROT SERPL-MCNC: 6.6 G/DL
SODIUM SERPL-SCNC: 144 MMOL/L
T4 FREE SERPL-MCNC: 1 NG/DL
THYROGLOB AB SERPL-ACNC: <20 IU/ML
THYROPEROXIDASE AB SERPL IA-ACNC: <10 IU/ML
TRIGL SERPL-MCNC: 225 MG/DL
TSH SERPL-ACNC: 4.79 UIU/ML

## 2019-12-03 ENCOUNTER — APPOINTMENT (OUTPATIENT)
Dept: PULMONOLOGY | Facility: CLINIC | Age: 59
End: 2019-12-03

## 2020-05-06 ENCOUNTER — RX RENEWAL (OUTPATIENT)
Age: 60
End: 2020-05-06

## 2020-05-06 ENCOUNTER — APPOINTMENT (OUTPATIENT)
Dept: FAMILY MEDICINE | Facility: CLINIC | Age: 60
End: 2020-05-06
Payer: COMMERCIAL

## 2020-05-06 VITALS — BODY MASS INDEX: 22.2 KG/M2 | WEIGHT: 130 LBS | HEIGHT: 64 IN

## 2020-05-06 PROCEDURE — 99214 OFFICE O/P EST MOD 30 MIN: CPT | Mod: 95

## 2020-05-06 NOTE — PLAN
[FreeTextEntry1] : CARDIOLOGY CONSULTANT NOTE APPRECIATED\par HEALTHY DIET AND LIFESTYLE MODIFICATIONS\par CONTINUE CURRENT MEDICATIONS\par CHECK ROUTINE LAB WORK INCLUDING LIPIDS\par FOLLOW-UP ALL SPECIALISTS AS DIRECTED INCLUDING PULMONOLOGY AND GYN\par HAVING BREAST IMAGING DONE THROUGH HER GYN\par CALL WITH ANY QUESTIONS, CONCERNS OR CHANGES \par FOLLOW-UP IN OFFICE WHEN ABLE

## 2020-05-06 NOTE — PHYSICAL EXAM
[No Acute Distress] : no acute distress [Well Nourished] : well nourished [Well-Appearing] : well-appearing [No Respiratory Distress] : no respiratory distress  [Speech Grossly Normal] : speech grossly normal [Normal Mood] : the mood was normal [Normal Insight/Judgement] : insight and judgment were intact

## 2020-05-06 NOTE — HISTORY OF PRESENT ILLNESS
[Home] : at home, [unfilled] , at the time of the visit. [Medical Office: ([facility])___] : at the medical office located in  [Patient] : the patient [Self] : self [FreeTextEntry1] : f/u htn [de-identified] : start time: 1:20\par end time: 1:29\par \par MS. CANTOR IS A PLEASANT 60 YO PRESENTING FOR FOLLOW-UP. CHRONIC HISTORY OF HYPERTENSION, HYPERLIPIDEMIA, COPD, CAROTID DISEASE AND VITAMIN D INSUFFICIENCY. REMAINS ON OLMESARTAN FOR BLOOD PRESSURE AND ON LIPITOR AND GEMFIBROZIL FOR CHOLESTEROL.  STILL TAKING VITAMIN D SUPPLEMENTS.  REMAINING ACTIVE.  GARDENING AND EXERCISING.  DIET HAS BEEN "GOOD".  STILL NEEDS TO F/U WITH PULMONOLOGY AND IS TO SEE GYN.   SHE IS NOW MANAGING HER BREAST IMAGING.\par CURRENTLY TAKING MEDICATION FOR SINUS INFECTION PRESCRIBED BY ANOTHER PROVIDER.  SAW CARDIOLOGY IN JANUARY AND HAD CAROTIDS AND STRESS ECHO.  SEEN EVERY SIX MONTHS.

## 2020-05-11 ENCOUNTER — APPOINTMENT (OUTPATIENT)
Dept: GASTROENTEROLOGY | Facility: CLINIC | Age: 60
End: 2020-05-11
Payer: COMMERCIAL

## 2020-05-11 ENCOUNTER — APPOINTMENT (OUTPATIENT)
Dept: FAMILY MEDICINE | Facility: CLINIC | Age: 60
End: 2020-05-11
Payer: COMMERCIAL

## 2020-05-11 VITALS — HEIGHT: 64 IN | BODY MASS INDEX: 21.68 KG/M2 | WEIGHT: 127 LBS

## 2020-05-11 VITALS
DIASTOLIC BLOOD PRESSURE: 58 MMHG | WEIGHT: 128 LBS | HEART RATE: 85 BPM | TEMPERATURE: 98.6 F | BODY MASS INDEX: 21.85 KG/M2 | RESPIRATION RATE: 16 BRPM | HEIGHT: 64 IN | OXYGEN SATURATION: 98 % | SYSTOLIC BLOOD PRESSURE: 92 MMHG

## 2020-05-11 LAB
25(OH)D3 SERPL-MCNC: 37.3 NG/ML
ALBUMIN SERPL ELPH-MCNC: 4 G/DL
ALP BLD-CCNC: 104 U/L
ALT SERPL-CCNC: 213 U/L
ANION GAP SERPL CALC-SCNC: 15 MMOL/L
AST SERPL-CCNC: 461 U/L
BASOPHILS # BLD AUTO: 0.03 K/UL
BASOPHILS NFR BLD AUTO: 0.9 %
BILIRUB SERPL-MCNC: 0.4 MG/DL
BUN SERPL-MCNC: 26 MG/DL
CALCIUM SERPL-MCNC: 9.7 MG/DL
CHLORIDE SERPL-SCNC: 104 MMOL/L
CHOLEST SERPL-MCNC: 189 MG/DL
CHOLEST/HDLC SERPL: 3.5 RATIO
CO2 SERPL-SCNC: 21 MMOL/L
CREAT SERPL-MCNC: 1.06 MG/DL
EOSINOPHIL # BLD AUTO: 0.01 K/UL
EOSINOPHIL NFR BLD AUTO: 0.3 %
GLUCOSE SERPL-MCNC: 106 MG/DL
HCT VFR BLD CALC: 33.1 %
HDLC SERPL-MCNC: 54 MG/DL
HGB BLD-MCNC: 11 G/DL
IMM GRANULOCYTES NFR BLD AUTO: 0 %
LDLC SERPL CALC-MCNC: 91 MG/DL
LYMPHOCYTES # BLD AUTO: 0.64 K/UL
LYMPHOCYTES NFR BLD AUTO: 19.5 %
MAN DIFF?: NORMAL
MCHC RBC-ENTMCNC: 30.6 PG
MCHC RBC-ENTMCNC: 33.2 GM/DL
MCV RBC AUTO: 91.9 FL
MONOCYTES # BLD AUTO: 0.33 K/UL
MONOCYTES NFR BLD AUTO: 10.1 %
NEUTROPHILS # BLD AUTO: 2.27 K/UL
NEUTROPHILS NFR BLD AUTO: 69.2 %
PLATELET # BLD AUTO: 187 K/UL
POTASSIUM SERPL-SCNC: 3.9 MMOL/L
PROT SERPL-MCNC: 6.6 G/DL
RBC # BLD: 3.6 M/UL
RBC # FLD: 13.3 %
SODIUM SERPL-SCNC: 140 MMOL/L
T4 FREE SERPL-MCNC: 1 NG/DL
TRIGL SERPL-MCNC: 221 MG/DL
TSH SERPL-ACNC: 3.01 UIU/ML
WBC # FLD AUTO: 3.28 K/UL

## 2020-05-11 PROCEDURE — 99203 OFFICE O/P NEW LOW 30 MIN: CPT

## 2020-05-11 PROCEDURE — 99214 OFFICE O/P EST MOD 30 MIN: CPT | Mod: 95

## 2020-05-11 NOTE — PLAN
[FreeTextEntry1] : ADDITIONAL TIME SPENT REVIEWING PRIOR AND CURRENT LAB WORK, PRIOR IMAGING, REACHING OUT TO GI, ARRANGING APPOINTMENT FOR TODAY, DISCUSSED CASE, FOLLOWING UP FROM GI VISIT\par RECENT LAB WORK REVIEWED\par ADVISED TO HOLD CHOLESTEROL MEDICATION AT THIS TIME\par AVOIDANCE OF TYLENOL AND ETOH\par TO SEE GI THIS AFTERNOON FOR FURTHER EVALUATION AND TESTING\par TO ER WITH ANY CONCERNS OR SYMPTOMS\par CONTINUE VITAMIN D SUPPLEMENTS\par CALL WITH ANY QUESTIONS, CONCERNS OR CHANGES

## 2020-05-11 NOTE — ASSESSMENT
[FreeTextEntry1] : This a 60-year-old woman, who presents with elevated transaminases after recently starting doxycycline for a sinus infection. I've instructed her to hold the doxycycline (She only had one dose left). She likely has drug-induced liver injury. I will send autoimmune studies to rule out autoimmune hepatitis. She was instructed to hold her statin and I agree.

## 2020-05-11 NOTE — CONSULT LETTER
[Dear  ___] : Dear  [unfilled], [Consult Letter:] : I had the pleasure of evaluating your patient, [unfilled]. [Consult Closing:] : Thank you very much for allowing me to participate in the care of this patient.  If you have any questions, please do not hesitate to contact me. [Please see my note below.] : Please see my note below. [Sincerely,] : Sincerely, [FreeTextEntry3] : Bennie Ovalle M.D.

## 2020-05-11 NOTE — HISTORY OF PRESENT ILLNESS
[Home] : at home, [unfilled] , at the time of the visit. [Medical Office: ([facility])___] : at the medical office located in  [Patient] : the patient [Self] : self [FreeTextEntry1] : F/U LAB WORK [de-identified] : START TIME: 11:06\par END TIME: 11:14 PLUS ADDITIONAL TIME SPENT REVIEWING RECORDS AND DISCUSSING CARE WITH GI\par \par MS. CANTOR IS A PLEASANT 59 YO FOR LAB WORK REVIEW.  IS FEELING WELL   DENIES ABDOMINAL PAIN OR N/V/D.  HAS BEEN ON CHOLESTEROL MEDICATIONS FOR MANY YEARS.  NOTES THAT SHE WAS TAKING MEDICATION WITH TYLENOL IN IT WHEN BEING TREATED FOR A SINUS INFECTION RECENTLY.  THE ONLY NEW MEDICATION SHE TOOK WAS DOXYCYCLINE - LAST TOOK YESTERDAY WHICH A TELEMEDICINE PHYSICIAN GAVE HER TO TREAT HER SINUSES.  DOES NOT ROUTINELY DRINK ALCOHOL.  SHE HAS NEVER HAD SIGNIFICANTLY ELEVATED TRANSAMINASES IN THE PAST.  IS TAKING VITAMIN D SUPPLEMENTS FOR VITAMIN D INSUFFICIENCY.

## 2020-05-11 NOTE — HISTORY OF PRESENT ILLNESS
[de-identified] : This is a 60-year-old woman, who presents with elevated transaminases. She states she's been having sinus issues; in December of 2019 she was started on amoxicillin but she didn't tolerate it. She had GI issues, so she stopped it after 3 days.  She continued to have sinus issues and postnasal drip. She had a Tylenol for visit this past week and was started on doxycycline a week ago. She had routine lab work on the sixth and was found to have elevated transaminases. Her AST was 4 and 61 and ALT was 213 but the other liver tests were normal. She had mildly elevated transaminases in November of 2019, but there only 43 and 55 respectively.  She denies any other new supplements or medications. She has been on gemfibrozil, all and Lipitor for several years. She had a viral hepatitis panel in 2017, which was negative. She's had celiac testing in 20 17, which was negative. She denies excessive alcohol intake. She did have a drink on Sunday, but she rarely drinks alcohol. She denies any abdominal pain, nausea, vomiting, weight loss, or fever.

## 2020-05-11 NOTE — PHYSICAL EXAM
[General Appearance - Alert] : alert [General Appearance - In No Acute Distress] : in no acute distress [PERRL With Normal Accommodation] : pupils were equal in size, round, and reactive to light [Sclera] : the sclera and conjunctiva were normal [Extraocular Movements] : extraocular movements were intact [Outer Ear] : the ears and nose were normal in appearance [Oropharynx] : the oropharynx was normal [Neck Appearance] : the appearance of the neck was normal [Jugular Venous Distention Increased] : there was no jugular-venous distention [Neck Cervical Mass (___cm)] : no neck mass was observed [Thyroid Diffuse Enlargement] : the thyroid was not enlarged [Thyroid Nodule] : there were no palpable thyroid nodules [Skin Color & Pigmentation] : normal skin color and pigmentation [Skin Turgor] : normal skin turgor [] : no rash [Oriented To Time, Place, And Person] : oriented to person, place, and time [Impaired Insight] : insight and judgment were intact [Affect] : the affect was normal

## 2020-05-14 LAB
ALBUMIN SERPL ELPH-MCNC: 3.9 G/DL
ALP BLD-CCNC: 130 U/L
ALT SERPL-CCNC: 421 U/L
AST SERPL-CCNC: 415 U/L
BILIRUB DIRECT SERPL-MCNC: 0.2 MG/DL
BILIRUB INDIRECT SERPL-MCNC: 0.3 MG/DL
BILIRUB SERPL-MCNC: 0.6 MG/DL
DEPRECATED KAPPA LC FREE/LAMBDA SER: 1.03 RATIO
IGA SER QL IEP: 292 MG/DL
IGG SER QL IEP: 1001 MG/DL
IGM SER QL IEP: 206 MG/DL
KAPPA LC CSF-MCNC: 3.41 MG/DL
KAPPA LC SERPL-MCNC: 3.52 MG/DL
PROT SERPL-MCNC: 6.6 G/DL

## 2020-05-18 LAB
ANA PAT FLD IF-IMP: ABNORMAL
ANA SER IF-ACNC: ABNORMAL
MITOCHONDRIA AB SER IF-ACNC: NORMAL

## 2020-05-22 LAB
ALBUMIN MFR SERPL ELPH: 55.9 %
ALBUMIN SERPL ELPH-MCNC: 4.1 G/DL
ALBUMIN SERPL-MCNC: 3.6 G/DL
ALBUMIN/GLOB SERPL: 1.2 RATIO
ALP BLD-CCNC: 116 U/L
ALPHA1 GLOB MFR SERPL ELPH: 5.4 %
ALPHA1 GLOB SERPL ELPH-MCNC: 0.4 G/DL
ALPHA2 GLOB MFR SERPL ELPH: 10.1 %
ALPHA2 GLOB SERPL ELPH-MCNC: 0.7 G/DL
ALT SERPL-CCNC: 256 U/L
AST SERPL-CCNC: 230 U/L
B-GLOBULIN MFR SERPL ELPH: 11.9 %
B-GLOBULIN SERPL ELPH-MCNC: 0.8 G/DL
BILIRUB DIRECT SERPL-MCNC: 0.3 MG/DL
BILIRUB INDIRECT SERPL-MCNC: 0.3 MG/DL
BILIRUB SERPL-MCNC: 0.6 MG/DL
GAMMA GLOB FLD ELPH-MCNC: 1.1 G/DL
GAMMA GLOB MFR SERPL ELPH: 16.7 %
INR PPP: 1 RATIO
INTERPRETATION SERPL IEP-IMP: NORMAL
PROT SERPL-MCNC: 6.5 G/DL
PROT SERPL-MCNC: 6.5 G/DL
PROT SERPL-MCNC: 6.7 G/DL
PT BLD: 11.4 SEC

## 2020-07-02 ENCOUNTER — APPOINTMENT (OUTPATIENT)
Dept: FAMILY MEDICINE | Facility: CLINIC | Age: 60
End: 2020-07-02

## 2020-07-09 ENCOUNTER — APPOINTMENT (OUTPATIENT)
Dept: FAMILY MEDICINE | Facility: CLINIC | Age: 60
End: 2020-07-09
Payer: COMMERCIAL

## 2020-07-09 VITALS
HEIGHT: 64 IN | DIASTOLIC BLOOD PRESSURE: 60 MMHG | HEART RATE: 82 BPM | WEIGHT: 128 LBS | SYSTOLIC BLOOD PRESSURE: 100 MMHG | BODY MASS INDEX: 21.85 KG/M2

## 2020-07-09 PROCEDURE — 36415 COLL VENOUS BLD VENIPUNCTURE: CPT

## 2020-07-09 PROCEDURE — 99214 OFFICE O/P EST MOD 30 MIN: CPT | Mod: 25

## 2020-07-09 NOTE — PLAN
[FreeTextEntry1] : UNCLEAR CONVERSATION BETWEEN MS. CANTOR AND CARDIOLOGY - WILL ATTEMPT TO REACH OUT TO CLARIFY BLOOD PRESSURE MEDICATION FURTHER\par STATES STATIN RESTARTED BY GI\par WOULD LIKE TO GET REPEAT LFT'S FIRST TO ENSURE RESOLUTION OF PRIOR TRANSAMINITIS\par WILL ALSO CHECK VITAMIN D LEVELS\par OVERDUE TO SEE PULMONOLOGY\par FLU VACCINE IN THE FALL\par FOLLOW-UP ALL SPECIALISTS AS DIRECTED \par CALL WITH ANY QUESTIONS, CONCERNS OR CHANGES \par FOLLOW-UP FOR CPE

## 2020-07-09 NOTE — PHYSICAL EXAM
[No Acute Distress] : no acute distress [Well Nourished] : well nourished [Well-Appearing] : well-appearing [Normal Sclera/Conjunctiva] : normal sclera/conjunctiva [PERRL] : pupils equal round and reactive to light [No Lymphadenopathy] : no lymphadenopathy [Supple] : supple [No Respiratory Distress] : no respiratory distress  [No Accessory Muscle Use] : no accessory muscle use [Clear to Auscultation] : lungs were clear to auscultation bilaterally [Normal Rate] : normal rate  [Regular Rhythm] : with a regular rhythm [Normal S1, S2] : normal S1 and S2 [Soft] : abdomen soft [Non Tender] : non-tender [Normal Bowel Sounds] : normal bowel sounds [No Joint Swelling] : no joint swelling [Grossly Normal Strength/Tone] : grossly normal strength/tone

## 2020-07-09 NOTE — HISTORY OF PRESENT ILLNESS
[FreeTextEntry1] : PRESCRIPTIONS [de-identified] : MS. CANTOR IS A PLEASANT 61 YO PRESENTING FOR FOLLOW-UP.  HISTORY OF HYPERTENSION AND HYPERLIPIDEMIA.  STATES SHE CALLED CARDIOLOGY FOR RENEWAL TODAY AND WAS TOLD "TOO LONG TO BE ON MEDICATION" IN REGARDS TO [place].  SHE RESTARTED HER LIPITOR APPROXIMATELY ONE MONTH AGO.  STATES GI CLEARED HER TO START IT.  HAD REPEAT LAB WORK FOR ELEVATED TRANSAMINASES.  FELT TO BE RELATED ACUTELY TO ANTIBIOTIC USE.  HAS HAD NO ABDOMINAL PAIN.  IS TAKING VITAMIN D SUPPLEMENTS FOR VITAMIN D INSUFFICIENCY.  IS TO HAVE MAMMOGRAM THROUGH GYN.  HISTORY OF COPD - DUE TO FOLLOW-UP WITH PULMONOLOGY.  MISSED LAST APPOINTMENT.  NO NEW COMPLAINTS TODAY.

## 2020-07-13 ENCOUNTER — RX RENEWAL (OUTPATIENT)
Age: 60
End: 2020-07-13

## 2020-07-14 ENCOUNTER — EMERGENCY (EMERGENCY)
Facility: HOSPITAL | Age: 60
LOS: 1 days | Discharge: HOME | End: 2020-07-14
Attending: EMERGENCY MEDICINE
Payer: COMMERCIAL

## 2020-07-14 VITALS
OXYGEN SATURATION: 97 % | HEIGHT: 64 IN | DIASTOLIC BLOOD PRESSURE: 49 MMHG | TEMPERATURE: 98 F | SYSTOLIC BLOOD PRESSURE: 89 MMHG | WEIGHT: 130.07 LBS | RESPIRATION RATE: 18 BRPM | HEART RATE: 78 BPM

## 2020-07-14 VITALS
OXYGEN SATURATION: 98 % | DIASTOLIC BLOOD PRESSURE: 65 MMHG | SYSTOLIC BLOOD PRESSURE: 135 MMHG | TEMPERATURE: 98 F | HEART RATE: 76 BPM | RESPIRATION RATE: 19 BRPM

## 2020-07-14 LAB
ALBUMIN SERPL ELPH-MCNC: 3.8 G/DL — SIGNIFICANT CHANGE UP (ref 3.3–5.2)
ALBUMIN SERPL ELPH-MCNC: 4.1 G/DL
ALP BLD-CCNC: 83 U/L
ALP SERPL-CCNC: 88 U/L — SIGNIFICANT CHANGE UP (ref 40–120)
ALT FLD-CCNC: 139 U/L — HIGH
ALT SERPL-CCNC: 99 U/L
ANION GAP SERPL CALC-SCNC: 13 MMOL/L
ANION GAP SERPL CALC-SCNC: 13 MMOL/L — SIGNIFICANT CHANGE UP (ref 5–17)
APTT BLD: 30.6 SEC — SIGNIFICANT CHANGE UP (ref 27.5–35.5)
AST SERPL-CCNC: 146 U/L — HIGH
AST SERPL-CCNC: 84 U/L
BASOPHILS # BLD AUTO: 0.02 K/UL — SIGNIFICANT CHANGE UP (ref 0–0.2)
BASOPHILS # BLD AUTO: 0.04 K/UL
BASOPHILS NFR BLD AUTO: 0.4 % — SIGNIFICANT CHANGE UP (ref 0–2)
BASOPHILS NFR BLD AUTO: 0.6 %
BILIRUB SERPL-MCNC: 0.2 MG/DL
BILIRUB SERPL-MCNC: 0.3 MG/DL — LOW (ref 0.4–2)
BUN SERPL-MCNC: 16 MG/DL — SIGNIFICANT CHANGE UP (ref 8–20)
BUN SERPL-MCNC: 26 MG/DL
CALCIUM SERPL-MCNC: 9.2 MG/DL
CALCIUM SERPL-MCNC: 9.3 MG/DL — SIGNIFICANT CHANGE UP (ref 8.6–10.2)
CHLORIDE SERPL-SCNC: 104 MMOL/L
CHLORIDE SERPL-SCNC: 105 MMOL/L — SIGNIFICANT CHANGE UP (ref 98–107)
CO2 SERPL-SCNC: 19 MMOL/L
CO2 SERPL-SCNC: 21 MMOL/L — LOW (ref 22–29)
CREAT SERPL-MCNC: 1.41 MG/DL
CREAT SERPL-MCNC: 1.7 MG/DL — HIGH (ref 0.5–1.3)
EOSINOPHIL # BLD AUTO: 0.01 K/UL — SIGNIFICANT CHANGE UP (ref 0–0.5)
EOSINOPHIL # BLD AUTO: 0.02 K/UL
EOSINOPHIL NFR BLD AUTO: 0.2 % — SIGNIFICANT CHANGE UP (ref 0–6)
EOSINOPHIL NFR BLD AUTO: 0.3 %
FERRITIN SERPL-MCNC: 402 NG/ML
GLUCOSE SERPL-MCNC: 105 MG/DL
GLUCOSE SERPL-MCNC: 119 MG/DL — HIGH (ref 70–99)
HCT VFR BLD CALC: 24.9 % — LOW (ref 34.5–45)
HCT VFR BLD CALC: 27.4 %
HGB BLD-MCNC: 8.2 G/DL
HGB BLD-MCNC: 8.2 G/DL — LOW (ref 11.5–15.5)
IMM GRANULOCYTES NFR BLD AUTO: 0.6 % — SIGNIFICANT CHANGE UP (ref 0–1.5)
IMM GRANULOCYTES NFR BLD AUTO: 1.4 %
INR BLD: 1.05 RATIO — SIGNIFICANT CHANGE UP (ref 0.88–1.16)
IRON SATN MFR SERPL: 19 %
IRON SERPL-MCNC: 70 UG/DL
LYMPHOCYTES # BLD AUTO: 0.88 K/UL — LOW (ref 1–3.3)
LYMPHOCYTES # BLD AUTO: 1.29 K/UL
LYMPHOCYTES # BLD AUTO: 16.3 % — SIGNIFICANT CHANGE UP (ref 13–44)
LYMPHOCYTES NFR BLD AUTO: 18.5 %
MAN DIFF?: NORMAL
MCHC RBC-ENTMCNC: 29.9 GM/DL
MCHC RBC-ENTMCNC: 30.9 PG
MCHC RBC-ENTMCNC: 32.2 PG — SIGNIFICANT CHANGE UP (ref 27–34)
MCHC RBC-ENTMCNC: 32.9 GM/DL — SIGNIFICANT CHANGE UP (ref 32–36)
MCV RBC AUTO: 103.4 FL
MCV RBC AUTO: 97.6 FL — SIGNIFICANT CHANGE UP (ref 80–100)
MONOCYTES # BLD AUTO: 0.51 K/UL — SIGNIFICANT CHANGE UP (ref 0–0.9)
MONOCYTES # BLD AUTO: 0.75 K/UL
MONOCYTES NFR BLD AUTO: 10.8 %
MONOCYTES NFR BLD AUTO: 9.4 % — SIGNIFICANT CHANGE UP (ref 2–14)
NEUTROPHILS # BLD AUTO: 3.96 K/UL — SIGNIFICANT CHANGE UP (ref 1.8–7.4)
NEUTROPHILS # BLD AUTO: 4.77 K/UL
NEUTROPHILS NFR BLD AUTO: 68.4 %
NEUTROPHILS NFR BLD AUTO: 73.1 % — SIGNIFICANT CHANGE UP (ref 43–77)
OB PNL STL: NEGATIVE — SIGNIFICANT CHANGE UP
PLATELET # BLD AUTO: 218 K/UL — SIGNIFICANT CHANGE UP (ref 150–400)
PLATELET # BLD AUTO: 306 K/UL
POTASSIUM SERPL-MCNC: 4.7 MMOL/L — SIGNIFICANT CHANGE UP (ref 3.5–5.3)
POTASSIUM SERPL-SCNC: 4.7 MMOL/L — SIGNIFICANT CHANGE UP (ref 3.5–5.3)
POTASSIUM SERPL-SCNC: 5.2 MMOL/L
PROT SERPL-MCNC: 6.4 G/DL
PROT SERPL-MCNC: 6.4 G/DL — LOW (ref 6.6–8.7)
PROTHROM AB SERPL-ACNC: 12.1 SEC — SIGNIFICANT CHANGE UP (ref 10.6–13.6)
RBC # BLD: 2.55 M/UL — LOW (ref 3.8–5.2)
RBC # BLD: 2.65 M/UL
RBC # FLD: 13.3 % — SIGNIFICANT CHANGE UP (ref 10.3–14.5)
RBC # FLD: 14.1 %
SODIUM SERPL-SCNC: 136 MMOL/L
SODIUM SERPL-SCNC: 139 MMOL/L — SIGNIFICANT CHANGE UP (ref 135–145)
TIBC SERPL-MCNC: 374 UG/DL
TROPONIN T SERPL-MCNC: <0.01 NG/ML — SIGNIFICANT CHANGE UP (ref 0–0.06)
UIBC SERPL-MCNC: 305 UG/DL
WBC # BLD: 5.41 K/UL — SIGNIFICANT CHANGE UP (ref 3.8–10.5)
WBC # FLD AUTO: 5.41 K/UL — SIGNIFICANT CHANGE UP (ref 3.8–10.5)
WBC # FLD AUTO: 6.97 K/UL

## 2020-07-14 PROCEDURE — 86850 RBC ANTIBODY SCREEN: CPT

## 2020-07-14 PROCEDURE — 86901 BLOOD TYPING SEROLOGIC RH(D): CPT

## 2020-07-14 PROCEDURE — 84484 ASSAY OF TROPONIN QUANT: CPT

## 2020-07-14 PROCEDURE — 82272 OCCULT BLD FECES 1-3 TESTS: CPT

## 2020-07-14 PROCEDURE — 85730 THROMBOPLASTIN TIME PARTIAL: CPT

## 2020-07-14 PROCEDURE — 80053 COMPREHEN METABOLIC PANEL: CPT

## 2020-07-14 PROCEDURE — 96360 HYDRATION IV INFUSION INIT: CPT

## 2020-07-14 PROCEDURE — 71045 X-RAY EXAM CHEST 1 VIEW: CPT | Mod: 26

## 2020-07-14 PROCEDURE — 86900 BLOOD TYPING SEROLOGIC ABO: CPT

## 2020-07-14 PROCEDURE — 71045 X-RAY EXAM CHEST 1 VIEW: CPT

## 2020-07-14 PROCEDURE — 85027 COMPLETE CBC AUTOMATED: CPT

## 2020-07-14 PROCEDURE — 93005 ELECTROCARDIOGRAM TRACING: CPT

## 2020-07-14 PROCEDURE — 99285 EMERGENCY DEPT VISIT HI MDM: CPT

## 2020-07-14 PROCEDURE — 85610 PROTHROMBIN TIME: CPT

## 2020-07-14 PROCEDURE — 93010 ELECTROCARDIOGRAM REPORT: CPT

## 2020-07-14 PROCEDURE — 36415 COLL VENOUS BLD VENIPUNCTURE: CPT

## 2020-07-14 PROCEDURE — 99284 EMERGENCY DEPT VISIT MOD MDM: CPT | Mod: 25

## 2020-07-14 RX ORDER — SODIUM CHLORIDE 9 MG/ML
1000 INJECTION INTRAMUSCULAR; INTRAVENOUS; SUBCUTANEOUS ONCE
Refills: 0 | Status: COMPLETED | OUTPATIENT
Start: 2020-07-14 | End: 2020-07-14

## 2020-07-14 RX ADMIN — SODIUM CHLORIDE 1000 MILLILITER(S): 9 INJECTION INTRAMUSCULAR; INTRAVENOUS; SUBCUTANEOUS at 20:49

## 2020-07-14 RX ADMIN — SODIUM CHLORIDE 1000 MILLILITER(S): 9 INJECTION INTRAMUSCULAR; INTRAVENOUS; SUBCUTANEOUS at 21:49

## 2020-07-14 NOTE — ED ADULT TRIAGE NOTE - CHIEF COMPLAINT QUOTE
Pt states saw her PMD due to having sinus problems and being on a medication that affects her liver. Pt states she was told to come to ER due to anemia (hgb <8). Pt c/o mild lethargy and dark stools.

## 2020-07-14 NOTE — ED ADULT NURSE NOTE - OBJECTIVE STATEMENT
pt comes to ED ambulatory with reports of dark stool x over a week and states her Dr told her she was anemic. pt is AOX3, skin color normal for race. pt reports SOB on exertion which is not normal for her. pt states she was having blood work because her LFTs were elevated due to a medication she was put on. incidental finding of anemia. CORTES with strength and purpose, skin warm dry and intact. no chest pain no nausea no vomiting.

## 2020-07-14 NOTE — ED ADULT NURSE NOTE - NSIMPLEMENTINTERV_GEN_ALL_ED
Implemented All Universal Safety Interventions:  [place] to call system. Call bell, personal items and telephone within reach. Instruct patient to call for assistance. Room bathroom lighting operational. Non-slip footwear when patient is off stretcher. Physically safe environment: no spills, clutter or unnecessary equipment. Stretcher in lowest position, wheels locked, appropriate side rails in place.

## 2020-07-14 NOTE — ED PROVIDER NOTE - OBJECTIVE STATEMENT
Pt is a 61 y/o F w/PMHx HLD presents c/o shortness of breath with exertion x 2 months.  She was sent in by her PMD for low Hgb levels.  Pt states that she has been having shortness of breath when she is out gardening, or any minimal exertion.  She is able to lay flat at night to sleep, and has had no leg swelling, chest pain, n/v, diarrhea.  Pt states she think he stool has been darker in color.

## 2020-07-14 NOTE — ED PROVIDER NOTE - ATTENDING CONTRIBUTION TO CARE
I personally saw the patient with the resident, and completed the key components of the history and physical exam. I then discussed the management plan with the resident.   gen in nad resp clear cardiac no murmur abd soft nt nd neuro: CN II - XII intact, EOMI, PERRL, no papilledema, 5/5 muscle strength x 4 extremities, no sensory deficits, 2+ dtr globally, negative babinski, no ataxic gait, normal LA NENA and FNT, normal romberg

## 2020-07-14 NOTE — ED PROVIDER NOTE - CLINICAL SUMMARY MEDICAL DECISION MAKING FREE TEXT BOX
Pt is a 61 y/o M presents c/o shortness of breath, sent in by PMD for low Hgb, will rpt labs, reassess

## 2020-07-14 NOTE — ED PROVIDER NOTE - PATIENT PORTAL LINK FT
You can access the FollowMyHealth Patient Portal offered by [facility] by registering at the following website: [email or web address]. By joining [employer]’s FollowMyHealth portal, you will also be able to view your health information using other applications (apps) compatible with our system.

## 2020-07-15 LAB — ABO RH CONFIRMATION: SIGNIFICANT CHANGE UP

## 2020-07-16 LAB — HEMOCCULT STL QL IA: NEGATIVE

## 2020-07-16 RX ORDER — OLMESARTAN MEDOXOMIL 20 MG/1
20 TABLET, FILM COATED ORAL DAILY
Qty: 90 | Refills: 1 | Status: DISCONTINUED | COMMUNITY
Start: 2019-02-25 | End: 2020-07-16

## 2020-07-20 ENCOUNTER — RESULT REVIEW (OUTPATIENT)
Age: 60
End: 2020-07-20

## 2020-07-20 ENCOUNTER — APPOINTMENT (OUTPATIENT)
Dept: HEMATOLOGY ONCOLOGY | Facility: CLINIC | Age: 60
End: 2020-07-20

## 2020-07-20 ENCOUNTER — APPOINTMENT (OUTPATIENT)
Dept: HEMATOLOGY ONCOLOGY | Facility: CLINIC | Age: 60
End: 2020-07-20
Payer: COMMERCIAL

## 2020-07-20 ENCOUNTER — OUTPATIENT (OUTPATIENT)
Dept: OUTPATIENT SERVICES | Facility: HOSPITAL | Age: 60
LOS: 1 days | Discharge: HOME | End: 2020-07-20

## 2020-07-20 VITALS
HEIGHT: 64 IN | DIASTOLIC BLOOD PRESSURE: 67 MMHG | WEIGHT: 130 LBS | HEART RATE: 85 BPM | SYSTOLIC BLOOD PRESSURE: 122 MMHG | BODY MASS INDEX: 22.2 KG/M2 | OXYGEN SATURATION: 98 %

## 2020-07-20 DIAGNOSIS — D64.9 ANEMIA, UNSPECIFIED: ICD-10-CM

## 2020-07-20 PROBLEM — E78.5 HYPERLIPIDEMIA, UNSPECIFIED: Chronic | Status: ACTIVE | Noted: 2020-07-14

## 2020-07-20 LAB
BASOPHILS # BLD AUTO: 0 K/UL — SIGNIFICANT CHANGE UP (ref 0–0.2)
BASOPHILS NFR BLD AUTO: 0.7 % — SIGNIFICANT CHANGE UP (ref 0–2)
EOSINOPHIL # BLD AUTO: 0 K/UL — SIGNIFICANT CHANGE UP (ref 0–0.5)
EOSINOPHIL NFR BLD AUTO: 0.7 % — SIGNIFICANT CHANGE UP (ref 0–6)
HCT VFR BLD CALC: 26.8 % — LOW (ref 34.5–45)
HGB BLD-MCNC: 8.9 G/DL — LOW (ref 11.5–15.5)
LYMPHOCYTES # BLD AUTO: 0.5 K/UL — LOW (ref 1–3.3)
LYMPHOCYTES # BLD AUTO: 12.8 % — LOW (ref 13–44)
MCHC RBC-ENTMCNC: 31.4 PG — SIGNIFICANT CHANGE UP (ref 27–34)
MCHC RBC-ENTMCNC: 33.2 G/DL — SIGNIFICANT CHANGE UP (ref 32–36)
MCV RBC AUTO: 94.4 FL — SIGNIFICANT CHANGE UP (ref 80–100)
MONOCYTES # BLD AUTO: 0.4 K/UL — SIGNIFICANT CHANGE UP (ref 0–0.9)
MONOCYTES NFR BLD AUTO: 10.6 % — SIGNIFICANT CHANGE UP (ref 2–14)
NEUTROPHILS # BLD AUTO: 3 K/UL — SIGNIFICANT CHANGE UP (ref 1.8–7.4)
NEUTROPHILS NFR BLD AUTO: 75.1 % — SIGNIFICANT CHANGE UP (ref 43–77)
PLATELET # BLD AUTO: 174 K/UL — SIGNIFICANT CHANGE UP (ref 150–400)
RBC # BLD: 2.83 M/UL — LOW (ref 3.8–5.2)
RBC # FLD: 12.3 % — SIGNIFICANT CHANGE UP (ref 10.3–14.5)
WBC # BLD: 3.9 K/UL — SIGNIFICANT CHANGE UP (ref 3.8–10.5)
WBC # FLD AUTO: 3.9 K/UL — SIGNIFICANT CHANGE UP (ref 3.8–10.5)

## 2020-07-20 PROCEDURE — 99243 OFF/OP CNSLTJ NEW/EST LOW 30: CPT

## 2020-07-21 NOTE — REVIEW OF SYSTEMS
[Fatigue] : fatigue [Negative] : Heme/Lymph [Fever] : no fever [Abdominal Pain] : no abdominal pain [FreeTextEntry4] : Recurrent sinus infection [FreeTextEntry7] : Improving transaminitis

## 2020-07-21 NOTE — HISTORY OF PRESENT ILLNESS
[de-identified] : 60-year-old woman was referred for anemia.\par Hemoglobin is 8.9, hematocrit 26.8\par Stool for occult blood was negative.\par Serum iron 70, TIBC 374, ferritin 402.\par BUN 26, creatinine 1.4, SGOT 84, SGPT 99.\par \par Was experiencing recurrent sinus symptoms, treated with doxycycline with subsequent transaminase elevation thought to be related to drug hepatotoxicity.\par Dr. Maximus Means has evaluated So, and endoscopy results have been requested.\par A positive antinuclear antibody 1:320 speckled, was determined in May.\par

## 2020-07-21 NOTE — ASSESSMENT
[FreeTextEntry1] : Anemia of chronic renal insufficiency in a 60-year-old woman with stage III chronic renal disease, positive antinuclear antibody, and resolving drug-induced transaminitis.\par

## 2020-07-23 ENCOUNTER — APPOINTMENT (OUTPATIENT)
Dept: HEMATOLOGY ONCOLOGY | Facility: CLINIC | Age: 60
End: 2020-07-23

## 2020-07-24 ENCOUNTER — APPOINTMENT (OUTPATIENT)
Dept: NEPHROLOGY | Facility: CLINIC | Age: 60
End: 2020-07-24
Payer: COMMERCIAL

## 2020-07-24 VITALS
BODY MASS INDEX: 22.71 KG/M2 | WEIGHT: 133 LBS | HEART RATE: 86 BPM | HEIGHT: 64 IN | SYSTOLIC BLOOD PRESSURE: 110 MMHG | DIASTOLIC BLOOD PRESSURE: 69 MMHG

## 2020-07-24 PROCEDURE — 99205 OFFICE O/P NEW HI 60 MIN: CPT | Mod: 25

## 2020-07-24 PROCEDURE — 36415 COLL VENOUS BLD VENIPUNCTURE: CPT

## 2020-07-24 RX ORDER — UBIDECARENONE/VIT E ACET 100MG-5
50 MCG CAPSULE ORAL
Refills: 0 | Status: DISCONTINUED | COMMUNITY
End: 2020-07-24

## 2020-07-24 RX ORDER — IPRATROPIUM BROMIDE AND ALBUTEROL 20; 100 UG/1; UG/1
20-100 SPRAY, METERED RESPIRATORY (INHALATION) 4 TIMES DAILY
Qty: 1 | Refills: 5 | Status: DISCONTINUED | COMMUNITY
Start: 2019-06-12 | End: 2020-07-24

## 2020-07-24 NOTE — HISTORY OF PRESENT ILLNESS
[FreeTextEntry1] : C/O Occ. Muscle cramps @ Nite,\par \par Denies Herbal Products, NSAIDs,\par \par SSUI, Transaminitis,

## 2020-07-24 NOTE — PHYSICAL EXAM
[General Appearance - Alert] : alert [General Appearance - In No Acute Distress] : in no acute distress [PERRL With Normal Accommodation] : pupils were equal in size, round, and reactive to light [Extraocular Movements] : extraocular movements were intact [Sclera] : the sclera and conjunctiva were normal [Oropharynx] : the oropharynx was normal [Outer Ear] : the ears and nose were normal in appearance [Neck Appearance] : the appearance of the neck was normal [Neck Cervical Mass (___cm)] : no neck mass was observed [Jugular Venous Distention Increased] : there was no jugular-venous distention [Thyroid Diffuse Enlargement] : the thyroid was not enlarged [Auscultation Breath Sounds / Voice Sounds] : lungs were clear to auscultation bilaterally [Thyroid Nodule] : there were no palpable thyroid nodules [Heart Rate And Rhythm] : heart rate was normal and rhythm regular [Heart Sounds] : normal S1 and S2 [Heart Sounds Gallop] : no gallops [Murmurs] : no murmurs [Heart Sounds Pericardial Friction Rub] : no pericardial rub [Full Pulse] : the pedal pulses are present [Abdomen Tenderness] : non-tender [Bowel Sounds] : normal bowel sounds [Abdomen Soft] : soft [Edema] : there was no peripheral edema [Cervical Lymph Nodes Enlarged Posterior Bilaterally] : posterior cervical [Abdomen Mass (___ Cm)] : no abdominal mass palpated [Supraclavicular Lymph Nodes Enlarged Bilaterally] : supraclavicular [Axillary Lymph Nodes Enlarged Bilaterally] : axillary [Cervical Lymph Nodes Enlarged Anterior Bilaterally] : anterior cervical [Femoral Lymph Nodes Enlarged Bilaterally] : femoral [Inguinal Lymph Nodes Enlarged Bilaterally] : inguinal [No CVA Tenderness] : no ~M costovertebral angle tenderness [Abnormal Walk] : normal gait [No Spinal Tenderness] : no spinal tenderness [Nail Clubbing] : no clubbing  or cyanosis of the fingernails [Skin Color & Pigmentation] : normal skin color and pigmentation [Musculoskeletal - Swelling] : no joint swelling seen [Motor Tone] : muscle strength and tone were normal [Skin Turgor] : normal skin turgor [] : no rash [Deep Tendon Reflexes (DTR)] : deep tendon reflexes were 2+ and symmetric [Oriented To Time, Place, And Person] : oriented to person, place, and time [Sensation] : the sensory exam was normal to light touch and pinprick [No Focal Deficits] : no focal deficits [Affect] : the affect was normal [Impaired Insight] : insight and judgment were intact

## 2020-07-24 NOTE — ASSESSMENT
[FreeTextEntry1] : SUSI\par \par HTN \par \par Transaminitis, + LUIS,\par \par <1%, postmarketing and/or case reports (probable causation): Anemia, angioedema, arthralgia, blurred vision, bone marrow depression, cholecystitis, cholelithiasis, cholestatic jaundice, decreased libido, depression, dermatitis, dermatomyositis, dizziness, drowsiness, dysgeusia, eosinophilia, exfoliative dermatitis, headache, hypoesthesia, hypokalemia, impotence, increased creatine phosphokinase, increased serum alkaline phosphatase, increased serum bilirubin, increased serum transaminases, laryngeal edema, leukopenia, limb pain, myalgia, myasthenia, myopathy, nephrotoxicity, paresthesia, peripheral neuritis, polymyositis, pruritus, Raynaud phenomenon, rhabdomyolysis, synovitis, urticaria  Reports where causal relationship has not been established: Alopecia, anaphylaxis, cataract, colitis, confusion, extrasystoles, hepatic neoplasm, intracranial hemorrhage, lupus-like syndrome, pancreatitis, peripheral vascular disease, positive LUIS titer, reduced fertility (male), renal insufficiency, retinal edema, seizure, skin photosensitivity, syncope, thrombocytopenia, vasculitis, weight loss

## 2020-07-27 LAB
25(OH)D3 SERPL-MCNC: 62.9 NG/ML
ALBUMIN SERPL ELPH-MCNC: 4.1 G/DL
ALBUMIN SERPL ELPH-MCNC: 4.1 G/DL
ALP BLD-CCNC: 113 U/L
ALT SERPL-CCNC: 138 U/L
ANION GAP SERPL CALC-SCNC: 15 MMOL/L
APPEARANCE: CLEAR
AST SERPL-CCNC: 197 U/L
BACTERIA: NEGATIVE
BASOPHILS # BLD AUTO: 0.01 K/UL
BASOPHILS NFR BLD AUTO: 0.3 %
BILIRUB DIRECT SERPL-MCNC: 0.2 MG/DL
BILIRUB INDIRECT SERPL-MCNC: 0.2 MG/DL
BILIRUB SERPL-MCNC: 0.4 MG/DL
BILIRUBIN URINE: NEGATIVE
BLOOD URINE: NEGATIVE
BUN SERPL-MCNC: 17 MG/DL
C3 SERPL-MCNC: 124 MG/DL
C4 SERPL-MCNC: 19 MG/DL
CALCIUM SERPL-MCNC: 9.2 MG/DL
CALCIUM SERPL-MCNC: 9.2 MG/DL
CHLORIDE SERPL-SCNC: 104 MMOL/L
CK SERPL-CCNC: 92 U/L
CO2 SERPL-SCNC: 21 MMOL/L
COLOR: COLORLESS
CREAT SERPL-MCNC: 0.86 MG/DL
CREAT SPEC-SCNC: 20 MG/DL
CREAT/PROT UR: NORMAL RATIO
DSDNA AB SER-ACNC: <12 IU/ML
EOSINOPHIL # BLD AUTO: 0.01 K/UL
EOSINOPHIL NFR BLD AUTO: 0.3 %
ERYTHROCYTE [SEDIMENTATION RATE] IN BLOOD BY WESTERGREN METHOD: 28 MM/HR
GLUCOSE QUALITATIVE U: NEGATIVE
GLUCOSE SERPL-MCNC: 92 MG/DL
HCT VFR BLD CALC: 28.2 %
HGB BLD-MCNC: 8.7 G/DL
HYALINE CASTS: 0 /LPF
IMM GRANULOCYTES NFR BLD AUTO: 0.3 %
KETONES URINE: NEGATIVE
LEUKOCYTE ESTERASE URINE: NEGATIVE
LYMPHOCYTES # BLD AUTO: 0.81 K/UL
LYMPHOCYTES NFR BLD AUTO: 20.9 %
MAN DIFF?: NORMAL
MCHC RBC-ENTMCNC: 30.9 GM/DL
MCHC RBC-ENTMCNC: 31 PG
MCV RBC AUTO: 100.4 FL
MICROSCOPIC-UA: NORMAL
MONOCYTES # BLD AUTO: 0.48 K/UL
MONOCYTES NFR BLD AUTO: 12.4 %
NEUTROPHILS # BLD AUTO: 2.56 K/UL
NEUTROPHILS NFR BLD AUTO: 65.8 %
NITRITE URINE: NEGATIVE
PARATHYROID HORMONE INTACT: 23 PG/ML
PH URINE: 6.5
PHOSPHATE SERPL-MCNC: 3.7 MG/DL
PLATELET # BLD AUTO: 213 K/UL
POTASSIUM SERPL-SCNC: 4.2 MMOL/L
PROT SERPL-MCNC: 6.6 G/DL
PROT UR-MCNC: <4 MG/DL
PROTEIN URINE: NEGATIVE
RBC # BLD: 2.81 M/UL
RBC # FLD: 13.1 %
RED BLOOD CELLS URINE: 0 /HPF
SODIUM SERPL-SCNC: 140 MMOL/L
SPECIFIC GRAVITY URINE: 1
SQUAMOUS EPITHELIAL CELLS: 0 /HPF
UROBILINOGEN URINE: NORMAL
WBC # FLD AUTO: 3.88 K/UL
WHITE BLOOD CELLS URINE: 0 /HPF

## 2020-07-29 ENCOUNTER — APPOINTMENT (OUTPATIENT)
Dept: PULMONOLOGY | Facility: CLINIC | Age: 60
End: 2020-07-29
Payer: COMMERCIAL

## 2020-07-29 VITALS
SYSTOLIC BLOOD PRESSURE: 124 MMHG | DIASTOLIC BLOOD PRESSURE: 68 MMHG | OXYGEN SATURATION: 97 % | HEART RATE: 90 BPM | BODY MASS INDEX: 23 KG/M2 | WEIGHT: 134 LBS

## 2020-07-29 PROCEDURE — G0296 VISIT TO DETERM LDCT ELIG: CPT

## 2020-07-29 PROCEDURE — 99214 OFFICE O/P EST MOD 30 MIN: CPT | Mod: 25

## 2020-07-29 NOTE — CONSULT LETTER
[Consult Letter:] : I had the pleasure of evaluating your patient, [unfilled]. [Dear  ___] : Dear  [unfilled], [Please see my note below.] : Please see my note below. [Sincerely,] : Sincerely,

## 2020-07-29 NOTE — PHYSICAL EXAM
[Normal Oropharynx] : normal oropharynx [No Acute Distress] : no acute distress [Normal Appearance] : normal appearance [Normal S1, S2] : normal s1, s2 [Normal Rate/Rhythm] : normal rate/rhythm [No Neck Mass] : no neck mass [No Resp Distress] : no resp distress [No Murmurs] : no murmurs [Benign] : benign [Clear to Auscultation Bilaterally] : clear to auscultation bilaterally [No Abnormalities] : no abnormalities [Normal Gait] : normal gait [No Cyanosis] : no cyanosis [No Edema] : no edema [No Clubbing] : no clubbing [FROM] : FROM [Normal Color/ Pigmentation] : normal color/ pigmentation [No Focal Deficits] : no focal deficits [Oriented x3] : oriented x3 [Normal Affect] : normal affect

## 2020-07-29 NOTE — DISCUSSION/SUMMARY
[COPD] : chronic obstructive pulmonary disease [Stage II (Moderate)] : stage II (moderate) [Stable] : stable [Chest CT] : chest CT [Medication Changes Per Orders] : Medication changes are as documented in orders [FreeTextEntry1] : Due to the patient's history of prior tobacco use they fulfill criteria for low dose, lung cancer screening. This method was described to the patient with criteria for greater than 30 pack years of smoking, over the age of 55, and screening for 15 years following cessation of tobacco use.\par  [de-identified] : in july 2019

## 2020-07-29 NOTE — HISTORY OF PRESENT ILLNESS
[Doing Well] : doing well [None] : The patient is currently asymptomatic [Adherent] : the patient is adherent with ~his/her~ medication regimen [Tobacco Use] : ~He/She~ does not use tobacco [de-identified] : anemic undergoing w/u, ENT demonstrates mild sinusits [de-identified] : lung nodules,sinsitis [FreeTextEntry8] : 10 yrs tobacco free

## 2020-08-10 ENCOUNTER — APPOINTMENT (OUTPATIENT)
Dept: FAMILY MEDICINE | Facility: CLINIC | Age: 60
End: 2020-08-10
Payer: COMMERCIAL

## 2020-08-10 VITALS
HEIGHT: 64 IN | SYSTOLIC BLOOD PRESSURE: 130 MMHG | WEIGHT: 131 LBS | BODY MASS INDEX: 22.36 KG/M2 | DIASTOLIC BLOOD PRESSURE: 60 MMHG | HEART RATE: 74 BPM

## 2020-08-10 PROCEDURE — 99214 OFFICE O/P EST MOD 30 MIN: CPT

## 2020-08-13 RX ORDER — AMLODIPINE BESYLATE 5 MG/1
5 TABLET ORAL DAILY
Qty: 30 | Refills: 0 | Status: DISCONTINUED | COMMUNITY
Start: 2020-07-16 | End: 2020-08-13

## 2020-08-13 NOTE — HISTORY OF PRESENT ILLNESS
[FreeTextEntry1] : SWELLING [de-identified] : MS. CANTOR IS A PLEASANT 59 YO PRESENTING FOR FOLLOW-UP.  NOTED TO HAVE SWELLING IN HER FEET AND ANKLES LAST WEEK.  SHE STOPPED NORVASC ON FRIDAY WHICH SHE TAKES FOR HYPERTENSION AND HAS NOW NOTICED SIGNIFICANT IMPROVEMENT IN SWELLING.  HAS APPOINTMENT TO SEE CARDIOLOGY TOMORROW IN FOLLOW-UP.  HAS OTHERWISE BEEN FEELING VERY WELL.  SAW NEPRHOLOGY FOR SUSI.  ADDITIONAL LAB WORK AND RENAL SONOGRAM ORDERED.  ALSO SAW HEME/ONC FOR ANEMIA EVALUATION.  TO FOLLOW-UP AND HAVE LABS MONITORED.  WAS ALSO ADVISED TO SEE RHEUMATOLOGY FOR POSITIVE LUIS.  WAS SEEING GI FOR TRANSAMINITIS BUT HAS NOT FOLLOWED UP.

## 2020-08-13 NOTE — PHYSICAL EXAM
[Well Nourished] : well nourished [No Acute Distress] : no acute distress [Normal Sclera/Conjunctiva] : normal sclera/conjunctiva [Well-Appearing] : well-appearing [PERRL] : pupils equal round and reactive to light [Supple] : supple [No Lymphadenopathy] : no lymphadenopathy [No Respiratory Distress] : no respiratory distress  [No Accessory Muscle Use] : no accessory muscle use [Normal Rate] : normal rate  [Clear to Auscultation] : lungs were clear to auscultation bilaterally [Regular Rhythm] : with a regular rhythm [Normal S1, S2] : normal S1 and S2 [Soft] : abdomen soft [Non Tender] : non-tender [Grossly Normal Strength/Tone] : grossly normal strength/tone [Normal Bowel Sounds] : normal bowel sounds [de-identified] : SCANT SWELLING ANKLES

## 2020-09-08 ENCOUNTER — APPOINTMENT (OUTPATIENT)
Dept: GASTROENTEROLOGY | Facility: CLINIC | Age: 60
End: 2020-09-08

## 2020-09-25 ENCOUNTER — APPOINTMENT (OUTPATIENT)
Dept: RHEUMATOLOGY | Facility: CLINIC | Age: 60
End: 2020-09-25
Payer: COMMERCIAL

## 2020-09-25 VITALS
TEMPERATURE: 97.9 F | RESPIRATION RATE: 17 BRPM | BODY MASS INDEX: 23.22 KG/M2 | HEIGHT: 64 IN | HEART RATE: 101 BPM | OXYGEN SATURATION: 97 % | WEIGHT: 136 LBS | SYSTOLIC BLOOD PRESSURE: 144 MMHG | DIASTOLIC BLOOD PRESSURE: 82 MMHG

## 2020-09-25 PROCEDURE — 99204 OFFICE O/P NEW MOD 45 MIN: CPT

## 2020-09-25 NOTE — PHYSICAL EXAM
[General Appearance - Well Nourished] : well nourished [General Appearance - Well Developed] : well developed [Sclera] : the sclera and conjunctiva were normal [Hearing Threshold Finger Rub Not Hinds] : hearing was normal [Neck Cervical Mass (___cm)] : no neck mass was observed [Respiration, Rhythm And Depth] : normal respiratory rhythm and effort [Auscultation Breath Sounds / Voice Sounds] : lungs were clear to auscultation bilaterally [Heart Rate And Rhythm] : heart rate was normal and rhythm regular [Heart Sounds] : normal S1 and S2 [Nail Clubbing] : no clubbing  or cyanosis of the fingernails [Musculoskeletal - Swelling] : no joint swelling seen [Motor Tone] : muscle strength and tone were normal [] : no rash [Skin Lesions] : no skin lesions [Affect] : the affect was normal [Mood] : the mood was normal

## 2020-09-28 ENCOUNTER — LABORATORY RESULT (OUTPATIENT)
Age: 60
End: 2020-09-28

## 2020-10-02 NOTE — ASSESSMENT
[FreeTextEntry1] : Low suspicion for CTD despite positive LUIS \par --will check serologies \par --follow up with GI and heme given anemia and LFT Abnormaility\par \par of note labs are now back: she is noted to have positive LUIS 1:640 and RNP 5.5.  i have spoken to patient and she is asymptomatric.  I have explained to her that there is no concern for CTD.  Her anemia and renal issues has resolved.   she still has mild LFT abnormality for which she will follow up with GI in NOvember

## 2020-10-02 NOTE — REVIEW OF SYSTEMS
[Fever] : no fever [Chills] : no chills [Eye Pain] : no eye pain [Chest Pain] : no chest pain [Cough] : no cough [SOB on Exertion] : no shortness of breath during exertion [Vomiting] : no vomiting [Diarrhea] : no diarrhea [Skin Lesions] : no skin lesions [Anxiety] : no anxiety

## 2020-10-02 NOTE — DATA REVIEWED
[FreeTextEntry1] : labs after my appointment \par UA normal\par CBc normal\par cmp with ast 99 alt 80 alp 149\par cpk 101\par negative tpo/tg\par hep b/ c serologies negative \par hiv negtaive \par esr 29\par c3/4 normal\par negative dsdns/ro/la/sm/anca/coomb/\par LUIS 1:640 \par RNP 5.5\par tsh normal\par

## 2020-10-02 NOTE — HISTORY OF PRESENT ILLNESS
[FreeTextEntry1] : 61 yo woman with history of HTN, HLD, new onset of elevted LFts, new onset anemia ( normal iron) and transient SUSI presents for eval of positive LUIS 1:320- specked \par \par patient in may 2020 was noted to have mild anemia and sinus infection.  she was started doxy and while on this she was noted to have anemia hg 8s and lfts Abnormaility.  she saw gi and she was noted to have negative mitochondrial antibody.  Gi though it could possibly be doxy induced hepatitis however patient has been off and has worsen LFTs.  patient was also seen by nephrology as cr had increased to 1.4 but on repeat normalized.  UAs without blood or protein.  \par normal iron negative occult blood \par \par they stop doxy and gemfibrozil \par  \par denies any joint pain or swelling/alopecia/oral ulcers/malar/photosensitivity/eye issues/nose bleeding/cough/hemoptysis/ rashes// low plts, low wbc/serositis/ sob/cp/\par \par  [Fever] : no fever [Chills] : no chills [Malar Facial Rash] : no malar facial rash [Skin Lesions] : no lesions [Skin Nodules] : no skin nodules [Oral Ulcers] : no oral ulcers [Cough] : no cough [Dry Mouth] : no dry mouth [Shortness of Breath] : no shortness of breath [Morning Stiffness] : no morning stiffness [Eye Pain] : no eye pain [Eye Redness] : no eye redness [Dry Eyes] : no dry eyes

## 2020-10-07 LAB
ALBUMIN SERPL ELPH-MCNC: 4.3 G/DL
ALP BLD-CCNC: 149 U/L
ALT SERPL-CCNC: 80 U/L
ANA PAT FLD IF-IMP: ABNORMAL
ANA SER IF-ACNC: ABNORMAL
ANION GAP SERPL CALC-SCNC: 15 MMOL/L
APPEARANCE: CLEAR
AST SERPL-CCNC: 99 U/L
BASOPHILS # BLD AUTO: 0.03 K/UL
BASOPHILS NFR BLD AUTO: 0.4 %
BILIRUB SERPL-MCNC: 0.4 MG/DL
BILIRUBIN URINE: NEGATIVE
BLOOD URINE: NEGATIVE
BUN SERPL-MCNC: 15 MG/DL
C3 SERPL-MCNC: 134 MG/DL
C4 SERPL-MCNC: 22 MG/DL
CA SERPL QL: NORMAL
CALCIUM SERPL-MCNC: 9.2 MG/DL
CHLORIDE SERPL-SCNC: 104 MMOL/L
CK SERPL-CCNC: 101 U/L
CO2 SERPL-SCNC: 22 MMOL/L
COLOR: YELLOW
CREAT SERPL-MCNC: 0.79 MG/DL
CREAT SPEC-SCNC: 193 MG/DL
CREAT/PROT UR: 0.1 RATIO
CRP SERPL-MCNC: 0.14 MG/DL
DIRECT COOMBS: NORMAL
DSDNA AB SER-ACNC: <12 IU/ML
ENA RNP AB SER IA-ACNC: 5.5 AL
ENA SM AB SER IA-ACNC: <0.2 AL
ENA SS-A AB SER IA-ACNC: <0.2 AL
ENA SS-B AB SER IA-ACNC: <0.2 AL
EOSINOPHIL # BLD AUTO: 0.02 K/UL
EOSINOPHIL NFR BLD AUTO: 0.3 %
ERYTHROCYTE [SEDIMENTATION RATE] IN BLOOD BY WESTERGREN METHOD: 29 MM/HR
GLUCOSE QUALITATIVE U: NEGATIVE
GLUCOSE SERPL-MCNC: 115 MG/DL
HAPTOGLOB SERPL-MCNC: 165 MG/DL
HBV CORE IGG+IGM SER QL: NONREACTIVE
HBV SURFACE AB SER QL: NONREACTIVE
HBV SURFACE AG SER QL: NONREACTIVE
HCT VFR BLD CALC: 44.3 %
HCV AB SER QL: NONREACTIVE
HCV S/CO RATIO: 0.14 S/CO
HGB BLD-MCNC: 14.1 G/DL
HIV1+2 AB SPEC QL IA.RAPID: NONREACTIVE
IMM GRANULOCYTES NFR BLD AUTO: 0.3 %
KETONES URINE: NEGATIVE
LDH SERPL-CCNC: 217 U/L
LEUKOCYTE ESTERASE URINE: ABNORMAL
LYMPHOCYTES # BLD AUTO: 1.5 K/UL
LYMPHOCYTES NFR BLD AUTO: 20.5 %
MAN DIFF?: NORMAL
MCHC RBC-ENTMCNC: 29.6 PG
MCHC RBC-ENTMCNC: 31.8 GM/DL
MCV RBC AUTO: 92.9 FL
MONOCYTES # BLD AUTO: 0.62 K/UL
MONOCYTES NFR BLD AUTO: 8.5 %
MPO AB + PR3 PNL SER: NORMAL
NEUTROPHILS # BLD AUTO: 5.14 K/UL
NEUTROPHILS NFR BLD AUTO: 70 %
NITRITE URINE: NEGATIVE
PH URINE: 7
PLATELET # BLD AUTO: 203 K/UL
POTASSIUM SERPL-SCNC: 4 MMOL/L
PROT SERPL-MCNC: 7.3 G/DL
PROT UR-MCNC: 14 MG/DL
PROTEIN URINE: NORMAL
RBC # BLD: 4.77 M/UL
RBC # BLD: 4.77 M/UL
RBC # FLD: 13.1 %
RETICS # AUTO: 1.5 %
RETICS AGGREG/RBC NFR: 73.5 K/UL
SODIUM SERPL-SCNC: 142 MMOL/L
SPECIFIC GRAVITY URINE: 1.02
THYROGLOB AB SERPL-ACNC: <20 IU/ML
THYROPEROXIDASE AB SERPL IA-ACNC: 34.9 IU/ML
TSH SERPL-ACNC: 2.29 UIU/ML
UROBILINOGEN URINE: NORMAL
VIT B12 SERPL-MCNC: 477 PG/ML
WBC # FLD AUTO: 7.33 K/UL

## 2020-10-15 ENCOUNTER — OUTPATIENT (OUTPATIENT)
Dept: OUTPATIENT SERVICES | Facility: HOSPITAL | Age: 60
LOS: 1 days | Discharge: HOME | End: 2020-10-15

## 2020-10-15 DIAGNOSIS — D64.9 ANEMIA, UNSPECIFIED: ICD-10-CM

## 2020-10-22 ENCOUNTER — RESULT REVIEW (OUTPATIENT)
Age: 60
End: 2020-10-22

## 2020-10-22 ENCOUNTER — APPOINTMENT (OUTPATIENT)
Dept: HEMATOLOGY ONCOLOGY | Facility: CLINIC | Age: 60
End: 2020-10-22
Payer: COMMERCIAL

## 2020-10-22 VITALS
SYSTOLIC BLOOD PRESSURE: 157 MMHG | RESPIRATION RATE: 14 BRPM | BODY MASS INDEX: 23.11 KG/M2 | WEIGHT: 135.36 LBS | HEIGHT: 64 IN | DIASTOLIC BLOOD PRESSURE: 85 MMHG | TEMPERATURE: 97.4 F | HEART RATE: 90 BPM | OXYGEN SATURATION: 96 %

## 2020-10-22 LAB
ANISOCYTOSIS BLD QL: SLIGHT — SIGNIFICANT CHANGE UP
BASOPHILS # BLD AUTO: 0 K/UL — SIGNIFICANT CHANGE UP (ref 0–0.2)
EOSINOPHIL # BLD AUTO: 0 K/UL — SIGNIFICANT CHANGE UP (ref 0–0.5)
HCT VFR BLD CALC: 39.9 % — SIGNIFICANT CHANGE UP (ref 34.5–45)
HGB BLD-MCNC: 13 G/DL — SIGNIFICANT CHANGE UP (ref 11.5–15.5)
LG PLATELETS BLD QL AUTO: SLIGHT — SIGNIFICANT CHANGE UP
LYMPHOCYTES # BLD AUTO: 1.3 K/UL — SIGNIFICANT CHANGE UP (ref 1–3.3)
LYMPHOCYTES # BLD AUTO: 30 % — SIGNIFICANT CHANGE UP (ref 13–44)
MACROCYTES BLD QL: SLIGHT — SIGNIFICANT CHANGE UP
MCHC RBC-ENTMCNC: 28.5 PG — SIGNIFICANT CHANGE UP (ref 27–34)
MCHC RBC-ENTMCNC: 32.6 G/DL — SIGNIFICANT CHANGE UP (ref 32–36)
MCV RBC AUTO: 87.2 FL — SIGNIFICANT CHANGE UP (ref 80–100)
MICROCYTES BLD QL: SLIGHT — SIGNIFICANT CHANGE UP
MONOCYTES # BLD AUTO: 0.5 K/UL — SIGNIFICANT CHANGE UP (ref 0–0.9)
MONOCYTES NFR BLD AUTO: 11 % — SIGNIFICANT CHANGE UP (ref 2–14)
NEUTROPHILS # BLD AUTO: 2.4 K/UL — SIGNIFICANT CHANGE UP (ref 1.8–7.4)
NEUTROPHILS NFR BLD AUTO: 59 % — SIGNIFICANT CHANGE UP (ref 43–77)
OVALOCYTES BLD QL SMEAR: SLIGHT — SIGNIFICANT CHANGE UP
PLAT MORPH BLD: NORMAL — SIGNIFICANT CHANGE UP
PLATELET # BLD AUTO: 174 K/UL — SIGNIFICANT CHANGE UP (ref 150–400)
POIKILOCYTOSIS BLD QL AUTO: SLIGHT — SIGNIFICANT CHANGE UP
POLYCHROMASIA BLD QL SMEAR: SLIGHT — SIGNIFICANT CHANGE UP
RBC # BLD: 4.57 M/UL — SIGNIFICANT CHANGE UP (ref 3.8–5.2)
RBC # FLD: 13.7 % — SIGNIFICANT CHANGE UP (ref 10.3–14.5)
RBC BLD AUTO: SIGNIFICANT CHANGE UP
WBC # BLD: 4.2 K/UL — SIGNIFICANT CHANGE UP (ref 3.8–10.5)
WBC # FLD AUTO: 4.2 K/UL — SIGNIFICANT CHANGE UP (ref 3.8–10.5)

## 2020-10-22 PROCEDURE — 99213 OFFICE O/P EST LOW 20 MIN: CPT

## 2020-10-22 PROCEDURE — 99072 ADDL SUPL MATRL&STAF TM PHE: CPT

## 2020-10-23 NOTE — ASSESSMENT
[FreeTextEntry1] : 61 yo WF , who initially was referred for anemia. She had been on Doxycycline for a sinus infection, then found to be anemic, HGb at 8 gms, and had elevated liver enzymes. Also has been referred to Nephrology, for renal insufficiency, and Rheumatology for positive LUIS. has seen GI and no evidence of GI bleed.Her HGB and Renal function are now normal. No intervention is needed  at this time. She will have lab work done with Rheumatology, nephrology and will F/U with GI. RTO as needed.

## 2020-10-23 NOTE — HISTORY OF PRESENT ILLNESS
[de-identified] : 59 yo WF , who initially was referred for anemia. She had been on Doxycycline for a sinus infection, then found to be anemic, HGb at 8 gms, and had elevated liver enzymes. Also has been referred to Nephrology, for renal insufficiency, and Rheumatology for positive LUIS. has seen GI and no evidence of GI bleed. [de-identified] : She is feeling better, Has seen Rheumatolgy, last month, and lab work shows HGb of 14 gms, and persistent but better transaminitis. and inprovement in renal function. No evidence of bleeding.

## 2020-11-05 LAB
CK SERPL-CCNC: 84 U/L
GGT SERPL-CCNC: 229 U/L

## 2020-11-09 ENCOUNTER — APPOINTMENT (OUTPATIENT)
Dept: GASTROENTEROLOGY | Facility: CLINIC | Age: 60
End: 2020-11-09
Payer: COMMERCIAL

## 2020-11-09 VITALS
TEMPERATURE: 97.5 F | RESPIRATION RATE: 16 BRPM | SYSTOLIC BLOOD PRESSURE: 173 MMHG | HEART RATE: 81 BPM | WEIGHT: 132 LBS | OXYGEN SATURATION: 95 % | DIASTOLIC BLOOD PRESSURE: 92 MMHG | BODY MASS INDEX: 22.53 KG/M2 | HEIGHT: 64 IN

## 2020-11-09 DIAGNOSIS — Z78.9 OTHER SPECIFIED HEALTH STATUS: ICD-10-CM

## 2020-11-09 DIAGNOSIS — R76.8 OTHER SPECIFIED ABNORMAL IMMUNOLOGICAL FINDINGS IN SERUM: ICD-10-CM

## 2020-11-09 PROCEDURE — 99214 OFFICE O/P EST MOD 30 MIN: CPT

## 2020-11-09 PROCEDURE — 99072 ADDL SUPL MATRL&STAF TM PHE: CPT

## 2020-11-09 NOTE — PHYSICAL EXAM
[General Appearance - Alert] : alert [General Appearance - In No Acute Distress] : in no acute distress [Sclera] : the sclera and conjunctiva were normal [PERRL With Normal Accommodation] : pupils were equal in size, round, and reactive to light [Extraocular Movements] : extraocular movements were intact [Outer Ear] : the ears and nose were normal in appearance [Hearing Threshold Finger Rub Not [place]] : hearing was normal [Neck Appearance] : the appearance of the neck was normal [Neck Cervical Mass (___cm)] : no neck mass was observed [Jugular Venous Distention Increased] : there was no jugular-venous distention [Thyroid Diffuse Enlargement] : the thyroid was not enlarged [Thyroid Nodule] : there were no palpable thyroid nodules [Auscultation Breath Sounds / Voice Sounds] : lungs were clear to auscultation bilaterally [Heart Rate And Rhythm] : heart rate was normal and rhythm regular [Heart Sounds] : normal S1 and S2 [Heart Sounds Gallop] : no gallops [Murmurs] : no murmurs [Heart Sounds Pericardial Friction Rub] : no pericardial rub [Edema] : there was no peripheral edema [Bowel Sounds] : normal bowel sounds [Abdomen Soft] : soft [Abdomen Tenderness] : non-tender [] : no hepato-splenomegaly [Abdomen Mass (___ Cm)] : no abdominal mass palpated [Cervical Lymph Nodes Enlarged Posterior Bilaterally] : posterior cervical [Cervical Lymph Nodes Enlarged Anterior Bilaterally] : anterior cervical [Supraclavicular Lymph Nodes Enlarged Bilaterally] : supraclavicular [Nail Clubbing] : no clubbing  or cyanosis of the fingernails [Skin Color & Pigmentation] : normal skin color and pigmentation [Skin Turgor] : normal skin turgor [No Focal Deficits] : no focal deficits [Oriented To Time, Place, And Person] : oriented to person, place, and time [Impaired Insight] : insight and judgment were intact [Affect] : the affect was normal

## 2020-11-09 NOTE — ASSESSMENT
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  I strongly advised against alcohol intake.  I will send her for an ultrasound with Doppler, as no imaging of her liver has been done since the start.  Additionally, we will check blood work this week.  If improved and the imaging is unremarkable, will plan on repeating the blood work in December.  If there is a worsening of her labs, will seriously consider liver biopsy.

## 2020-11-09 NOTE — HISTORY OF PRESENT ILLNESS
[de-identified] : Patient presents for elevated liver enzyme follow up.  Elevation significantly improved since peak in July.  Patient reports drinking a bottle of wine over the course of a weekend, every weekend.  Has no abdominal pain or history of jaundice.

## 2020-11-10 LAB
A1AT SERPL-MCNC: 117 MG/DL
AFP-TM SERPL-MCNC: 3.5 NG/ML
ALBUMIN SERPL ELPH-MCNC: 4.1 G/DL
ALP BLD-CCNC: 107 U/L
ALT SERPL-CCNC: 30 U/L
ANION GAP SERPL CALC-SCNC: 11 MMOL/L
AST SERPL-CCNC: 36 U/L
BASOPHILS # BLD AUTO: 0.03 K/UL
BASOPHILS NFR BLD AUTO: 0.8 %
BILIRUB DIRECT SERPL-MCNC: 0.2 MG/DL
BILIRUB INDIRECT SERPL-MCNC: 0.6 MG/DL
BILIRUB SERPL-MCNC: 0.8 MG/DL
BUN SERPL-MCNC: 15 MG/DL
CALCIUM SERPL-MCNC: 8.9 MG/DL
CHLORIDE SERPL-SCNC: 108 MMOL/L
CO2 SERPL-SCNC: 23 MMOL/L
CREAT SERPL-MCNC: 0.83 MG/DL
EOSINOPHIL # BLD AUTO: 0.03 K/UL
EOSINOPHIL NFR BLD AUTO: 0.8 %
GGT SERPL-CCNC: 126 U/L
HCT VFR BLD CALC: 39.9 %
HGB BLD-MCNC: 12.5 G/DL
IMM GRANULOCYTES NFR BLD AUTO: 0.3 %
INR PPP: 0.97 RATIO
LYMPHOCYTES # BLD AUTO: 0.8 K/UL
LYMPHOCYTES NFR BLD AUTO: 20.8 %
MAGNESIUM SERPL-MCNC: 1.7 MG/DL
MAN DIFF?: NORMAL
MCHC RBC-ENTMCNC: 29 PG
MCHC RBC-ENTMCNC: 31.3 GM/DL
MCV RBC AUTO: 92.6 FL
MONOCYTES # BLD AUTO: 0.45 K/UL
MONOCYTES NFR BLD AUTO: 11.7 %
NEUTROPHILS # BLD AUTO: 2.52 K/UL
NEUTROPHILS NFR BLD AUTO: 65.6 %
PLATELET # BLD AUTO: 161 K/UL
POTASSIUM SERPL-SCNC: 3.9 MMOL/L
PROT SERPL-MCNC: 6.4 G/DL
PT BLD: 11.5 SEC
RBC # BLD: 4.31 M/UL
RBC # FLD: 14.5 %
SODIUM SERPL-SCNC: 143 MMOL/L
WBC # FLD AUTO: 3.84 K/UL

## 2020-11-12 LAB
DEPRECATED KAPPA LC FREE/LAMBDA SER: 1.02 RATIO
IGA SER QL IEP: 285 MG/DL
IGG SER QL IEP: 974 MG/DL
IGM SER QL IEP: 219 MG/DL
KAPPA LC CSF-MCNC: 2.23 MG/DL
KAPPA LC SERPL-MCNC: 2.28 MG/DL
SMOOTH MUSCLE AB SER QL IF: NORMAL
TTG IGA SER IA-ACNC: <1.2 U/ML
TTG IGA SER-ACNC: NEGATIVE

## 2020-11-17 ENCOUNTER — RX RENEWAL (OUTPATIENT)
Age: 60
End: 2020-11-17

## 2020-12-07 ENCOUNTER — APPOINTMENT (OUTPATIENT)
Dept: RHEUMATOLOGY | Facility: CLINIC | Age: 60
End: 2020-12-07
Payer: COMMERCIAL

## 2020-12-07 PROCEDURE — 99214 OFFICE O/P EST MOD 30 MIN: CPT | Mod: 95

## 2020-12-07 NOTE — HISTORY OF PRESENT ILLNESS
[Home] : at home, [unfilled] , at the time of the visit. [Medical Office: ([facility])___] : at the medical office located in  [Verbal consent obtained from patient] : the patient, [unfilled] [FreeTextEntry1] : today: was sent to me for positive LUIS and is noted to also have a positive RNA 5.5.  Her LFts, CR and hemoglobin has all normalized.  she denies any raynauds.  her cpks are normal and there is no weakness.  she had echo with no suggestion of pHTN in 2018.  she had CT which shows worsening emphysema.  her PFTs in 2018 show mild obstruction.

## 2020-12-07 NOTE — REVIEW OF SYSTEMS
[Fever] : no fever [Chills] : no chills [Eye Pain] : no eye pain [Nosebleeds] : no nosebleeds [Chest Pain] : no chest pain [Palpitations] : no palpitations [Cough] : no cough [SOB on Exertion] : no shortness of breath during exertion [Diarrhea] : no diarrhea [Joint Swelling] : no joint swelling [Skin Lesions] : no skin lesions

## 2020-12-07 NOTE — ASSESSMENT
[FreeTextEntry1] : 61 yo woman with history of alcohol use and emphysema presents after doxy induced drug reaction with abnormal LFTs, mildly worsen CR and anemia.  these have improved over time without any intervention.  she is noted to have a positive LUIS and RNP however she is asymptomatic otherwise\par \par --she will come in 4-6 months to be seen personally and we will repeat serologies at that time\par

## 2021-01-19 ENCOUNTER — APPOINTMENT (OUTPATIENT)
Dept: INTERNAL MEDICINE | Facility: CLINIC | Age: 61
End: 2021-01-19
Payer: COMMERCIAL

## 2021-01-19 VITALS
HEIGHT: 64 IN | HEART RATE: 83 BPM | RESPIRATION RATE: 16 BRPM | WEIGHT: 130 LBS | SYSTOLIC BLOOD PRESSURE: 126 MMHG | BODY MASS INDEX: 22.2 KG/M2 | DIASTOLIC BLOOD PRESSURE: 80 MMHG | TEMPERATURE: 97.2 F | OXYGEN SATURATION: 94 %

## 2021-01-19 DIAGNOSIS — M79.89 OTHER SPECIFIED SOFT TISSUE DISORDERS: ICD-10-CM

## 2021-01-19 DIAGNOSIS — Z01.811 ENCOUNTER FOR PREPROCEDURAL RESPIRATORY EXAMINATION: ICD-10-CM

## 2021-01-19 DIAGNOSIS — R79.89 OTHER SPECIFIED ABNORMAL FINDINGS OF BLOOD CHEMISTRY: ICD-10-CM

## 2021-01-19 DIAGNOSIS — D63.1 CHRONIC KIDNEY DISEASE, STAGE 3 UNSPECIFIED: ICD-10-CM

## 2021-01-19 DIAGNOSIS — N17.9 ACUTE KIDNEY FAILURE, UNSPECIFIED: ICD-10-CM

## 2021-01-19 DIAGNOSIS — M25.511 PAIN IN RIGHT SHOULDER: ICD-10-CM

## 2021-01-19 DIAGNOSIS — R74.8 ABNORMAL LEVELS OF OTHER SERUM ENZYMES: ICD-10-CM

## 2021-01-19 DIAGNOSIS — E55.9 VITAMIN D DEFICIENCY, UNSPECIFIED: ICD-10-CM

## 2021-01-19 DIAGNOSIS — Z86.79 PERSONAL HISTORY OF OTHER DISEASES OF THE CIRCULATORY SYSTEM: ICD-10-CM

## 2021-01-19 DIAGNOSIS — R10.9 UNSPECIFIED ABDOMINAL PAIN: ICD-10-CM

## 2021-01-19 DIAGNOSIS — N18.30 CHRONIC KIDNEY DISEASE, STAGE 3 UNSPECIFIED: ICD-10-CM

## 2021-01-19 PROCEDURE — G0444 DEPRESSION SCREEN ANNUAL: CPT | Mod: NC,59

## 2021-01-19 PROCEDURE — 90715 TDAP VACCINE 7 YRS/> IM: CPT

## 2021-01-19 PROCEDURE — 99072 ADDL SUPL MATRL&STAF TM PHE: CPT

## 2021-01-19 PROCEDURE — 90471 IMMUNIZATION ADMIN: CPT

## 2021-01-19 PROCEDURE — 99396 PREV VISIT EST AGE 40-64: CPT | Mod: 25

## 2021-01-19 PROCEDURE — 36415 COLL VENOUS BLD VENIPUNCTURE: CPT

## 2021-01-19 PROCEDURE — G0442 ANNUAL ALCOHOL SCREEN 15 MIN: CPT | Mod: NC,59

## 2021-01-19 RX ORDER — AZELASTINE HYDROCHLORIDE 137 UG/1
0.1 SPRAY, METERED NASAL
Qty: 30 | Refills: 0 | Status: DISCONTINUED | COMMUNITY
Start: 2020-11-11

## 2021-01-19 RX ORDER — HALOBETASOL PROPIONATE 0.5 MG/G
0.05 OINTMENT TOPICAL
Qty: 50 | Refills: 0 | Status: DISCONTINUED | COMMUNITY
Start: 2020-10-07

## 2021-01-20 LAB
ALBUMIN SERPL ELPH-MCNC: 4 G/DL
ALP BLD-CCNC: 144 U/L
ALT SERPL-CCNC: 27 U/L
ANION GAP SERPL CALC-SCNC: 15 MMOL/L
AST SERPL-CCNC: 29 U/L
BASOPHILS # BLD AUTO: 0.03 K/UL
BASOPHILS NFR BLD AUTO: 0.5 %
BILIRUB SERPL-MCNC: 0.7 MG/DL
BUN SERPL-MCNC: 14 MG/DL
CALCIUM SERPL-MCNC: 9.7 MG/DL
CHLORIDE SERPL-SCNC: 104 MMOL/L
CHOLEST SERPL-MCNC: 172 MG/DL
CO2 SERPL-SCNC: 21 MMOL/L
CREAT SERPL-MCNC: 0.94 MG/DL
EOSINOPHIL # BLD AUTO: 0.06 K/UL
EOSINOPHIL NFR BLD AUTO: 0.9 %
GLUCOSE SERPL-MCNC: 96 MG/DL
HCT VFR BLD CALC: 39.1 %
HDLC SERPL-MCNC: 50 MG/DL
HGB BLD-MCNC: 12.6 G/DL
IMM GRANULOCYTES NFR BLD AUTO: 0.3 %
LDLC SERPL CALC-MCNC: 74 MG/DL
LYMPHOCYTES # BLD AUTO: 1.18 K/UL
LYMPHOCYTES NFR BLD AUTO: 17.9 %
MAN DIFF?: NORMAL
MCHC RBC-ENTMCNC: 29.4 PG
MCHC RBC-ENTMCNC: 32.2 GM/DL
MCV RBC AUTO: 91.4 FL
MONOCYTES # BLD AUTO: 0.46 K/UL
MONOCYTES NFR BLD AUTO: 7 %
NEUTROPHILS # BLD AUTO: 4.86 K/UL
NEUTROPHILS NFR BLD AUTO: 73.4 %
NONHDLC SERPL-MCNC: 122 MG/DL
PLATELET # BLD AUTO: 223 K/UL
POTASSIUM SERPL-SCNC: 3.8 MMOL/L
PROT SERPL-MCNC: 6.5 G/DL
RBC # BLD: 4.28 M/UL
RBC # FLD: 12.5 %
SODIUM SERPL-SCNC: 140 MMOL/L
TRIGL SERPL-MCNC: 238 MG/DL
WBC # FLD AUTO: 6.61 K/UL

## 2021-01-21 ENCOUNTER — NON-APPOINTMENT (OUTPATIENT)
Age: 61
End: 2021-01-21

## 2021-01-21 LAB — GGT SERPL-CCNC: 61 U/L

## 2021-01-21 NOTE — ADDENDUM
[FreeTextEntry1] : CBC WNL\par Lipid Panel WNL\par CMP \par \par #1 Elevated ALP w/ ELevated GGT. She has been following with GI regarding this (Last visit in november 2020). Therefore, I recommend she f/u again with GI

## 2021-01-21 NOTE — ASSESSMENT
[FreeTextEntry1] : \par -PMH: HLD, COPD, Strong FH of Heart Dz\par -SH: . Employed. Former smoker (Quit 2010). Occasional EtOH use\par \par SHEFALI is a 60 year F whom is here today for an annual well check and to establish care w/ a new PMD\par \par Specialists Involved:\par -Cardio: Dr. Pelaez\par -OBGYN: Dr. Jameson\par -Pulm: Dr. Peña\par -GI: Dr. Ram\par \par Tdap administered in office today\par \par -F/u labs drawn in office today\par -Further recs pending lab results\par -Continue f/u w/ Cardio (HLD, Strong FH)\par -Continue yearly f/u w/ Pulm (COPD, Lung CA Screening)\par -F/u DEXA\par -F/u w/ Ins Co (Vaccine Coverage; PPSV23, Shingrix)\par -F/u w/ GI (Repeat Colonscopy)\par -F/u Gyn (Pap)\par -F/u Mammogram from [facility] a few months ago\par -RTO 6mo for routine f/u & labs or sooner if needed

## 2021-01-21 NOTE — HISTORY OF PRESENT ILLNESS
[FreeTextEntry1] : Annual well visit [de-identified] : -PMH: HLD, COPD, Strong FH of Heart Dz\par -SH: . Employed. Former smoker (Quit ). Occasional EtOH use\par \par SHEFALI is a 60 year F whom is here today for an annual well check and to establish care w/ a new PMD\par Today, pt reports feeling well and is w/o complaints. \par She denies any changes since f/u w/ prior PMD\par Consents to TDap vaccine today\par \par Specialists Involved:\par -Cardio: Dr. Pelaez\par -OBGYN: Dr. Jameson\par -Pulm: Dr. Peña\par -GI: Dr. Ram\par \par -Vaccines: Needs PPSV 23, Shingles (Will call Ins [employer])\par -DEXA: Scrip given today \par -Mammogram: 2019\par -Pap Smear: \par -Colonoscopy: 2018. Repeat in 3 yrs\par -Lung CT CA Screenin2020\par -FH of Colon, breast or ovarian CA: Maternal Aunt had Breast CA\par \par -COPD: Quit in . She does follow with Pulm yearly for routine f/u and repeat CT Chest for Lung CA Screening. No other reported changes. \par -HLD: Remains on Atorvastatin 40mg HS & Vascepa 2g BID. No reported changes. 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

## 2021-01-21 NOTE — HEALTH RISK ASSESSMENT
[Very Good] : ~his/her~  mood as very good [Yes] : Yes [2 - 4 times a month (2 pts)] : 2-4 times a month (2 points) [5 or 6 (2 pts)] : 5 or 6 (2  points) [Weekly (3 pts)] : Weekly (3 points) [No] : In the past 12 months have you used drugs other than those required for medical reasons? No [No falls in past year] : Patient reported no falls in the past year [0] : 2) Feeling down, depressed, or hopeless: Not at all (0) [Patient reported mammogram was normal] : Patient reported mammogram was normal [Patient reported PAP Smear was normal] : Patient reported PAP Smear was normal [Patient reported colonoscopy was abnormal] : Patient reported colonoscopy was abnormal [HIV test declined] : HIV test declined [Hepatitis C test declined] : Hepatitis C test declined [None] : None [With Family] : lives with family [Employed] : employed [] :  [# Of Children ___] : has [unfilled] children [Fully functional (bathing, dressing, toileting, transferring, walking, feeding)] : Fully functional (bathing, dressing, toileting, transferring, walking, feeding) [Fully functional (using the telephone, shopping, preparing meals, housekeeping, doing laundry, using] : Fully functional and needs no help or supervision to perform IADLs (using the telephone, shopping, preparing meals, housekeeping, doing laundry, using transportation, managing medications and managing finances) [Reviewed no changes] : Reviewed no changes [] : No [YearQuit] : 2010 [Audit-CScore] : 7 [[id number]] : 0 [Change in mental status noted] : No change in mental status noted [Sexually Active] : not sexually active [Reports changes in hearing] : Reports no changes in hearing [Reports changes in vision] : Reports no changes in vision [MammogramDate] : 06/19 [PapSmearDate] : 01/19 [ColonoscopyDate] : 06/18 [AdvancecareDate] : 01/21

## 2021-02-12 ENCOUNTER — APPOINTMENT (OUTPATIENT)
Dept: PULMONOLOGY | Facility: CLINIC | Age: 61
End: 2021-02-12
Payer: COMMERCIAL

## 2021-02-12 VITALS
BODY MASS INDEX: 21.68 KG/M2 | HEART RATE: 100 BPM | OXYGEN SATURATION: 95 % | DIASTOLIC BLOOD PRESSURE: 78 MMHG | RESPIRATION RATE: 16 BRPM | TEMPERATURE: 97.4 F | WEIGHT: 127 LBS | SYSTOLIC BLOOD PRESSURE: 160 MMHG | HEIGHT: 64 IN

## 2021-02-12 PROCEDURE — 99072 ADDL SUPL MATRL&STAF TM PHE: CPT

## 2021-02-12 PROCEDURE — 99214 OFFICE O/P EST MOD 30 MIN: CPT

## 2021-02-12 RX ORDER — ICOSAPENT ETHYL 1000 MG/1
1 CAPSULE ORAL
Qty: 360 | Refills: 0 | Status: COMPLETED | COMMUNITY
Start: 2016-09-30 | End: 2021-02-12

## 2021-02-12 RX ORDER — ATORVASTATIN CALCIUM 40 MG/1
40 TABLET, FILM COATED ORAL
Qty: 60 | Refills: 0 | Status: COMPLETED | COMMUNITY
Start: 2016-10-31 | End: 2021-02-12

## 2021-02-12 NOTE — DISCUSSION/SUMMARY
[COPD] : chronic obstructive pulmonary disease [Stage II (Moderate)] : stage II (moderate) [Chest CT] : chest CT [Medication Changes Per Orders] : Medication changes are as documented in orders [de-identified] : in july 2019 [Decompensated] : decompensated [FreeTextEntry1] : Due to the patient's history of prior tobacco use they fulfill criteria for low dose, lung cancer screening. This method was described to the patient with criteria for greater than 30 pack years of smoking, over the age of 55, and screening for 15 years following cessation of tobacco use.\par

## 2021-02-12 NOTE — HISTORY OF PRESENT ILLNESS
[Doing Well] : doing well [Adherent] : the patient is adherent with ~his/her~ medication regimen [Difficulty Breathing During Exertion] : worsened dyspnea on exertion [Feelings Of Weakness On Exertion] : worsened exercise intolerance [Cough] : worsened coughing [Coughing Up Sputum] : worsened coughing up sputum [Wheezing] : denies wheezing [Regional Soft Tissue Swelling Both Lower Extremities] : denies lower extremity edema [TextBox_4] : 6 months \par 1 wk cold [ESS] : 0 [Wt Gain ___ Lbs] : no recent weight gain [Wt Loss ___ Lbs] : no recent weight loss [Tobacco Use] : ~He/She~ does not use tobacco [de-identified] : lung nodules,sinusitis, anemia [de-identified] : The past 6 months. The patient has noted increased shortness of breath associated with chest heaviness. In the last week. She has been complaining of yellow sputum production. There is no history of leg edema, paroxysmal nocturnal dyspnea, or orthopnea. [FreeTextEntry8] : 10 yrs tobacco free

## 2021-02-12 NOTE — PHYSICAL EXAM
[No Acute Distress] : no acute distress [Normal Oropharynx] : normal oropharynx [Normal Appearance] : normal appearance [Normal Rate/Rhythm] : normal rate/rhythm [No Neck Mass] : no neck mass [Normal S1, S2] : normal s1, s2 [No Murmurs] : no murmurs [No Abnormalities] : no abnormalities [Benign] : benign [No Clubbing] : no clubbing [Normal Gait] : normal gait [No Cyanosis] : no cyanosis [No Edema] : no edema [FROM] : FROM [Normal Color/ Pigmentation] : normal color/ pigmentation [No Focal Deficits] : no focal deficits [Oriented x3] : oriented x3 [Normal Affect] : normal affect [TextBox_68] : Decreased breath sounds bilateral

## 2021-02-15 ENCOUNTER — NON-APPOINTMENT (OUTPATIENT)
Age: 61
End: 2021-02-15

## 2021-04-17 ENCOUNTER — APPOINTMENT (OUTPATIENT)
Dept: DISASTER EMERGENCY | Facility: CLINIC | Age: 61
End: 2021-04-17

## 2021-04-19 ENCOUNTER — NON-APPOINTMENT (OUTPATIENT)
Age: 61
End: 2021-04-19

## 2021-04-21 ENCOUNTER — APPOINTMENT (OUTPATIENT)
Dept: PULMONOLOGY | Facility: CLINIC | Age: 61
End: 2021-04-21

## 2021-04-26 ENCOUNTER — APPOINTMENT (OUTPATIENT)
Dept: INTERNAL MEDICINE | Facility: CLINIC | Age: 61
End: 2021-04-26
Payer: COMMERCIAL

## 2021-04-26 VITALS
DIASTOLIC BLOOD PRESSURE: 62 MMHG | HEART RATE: 97 BPM | OXYGEN SATURATION: 96 % | SYSTOLIC BLOOD PRESSURE: 124 MMHG | BODY MASS INDEX: 23.05 KG/M2 | TEMPERATURE: 97.1 F | WEIGHT: 135 LBS | HEIGHT: 64 IN | RESPIRATION RATE: 16 BRPM

## 2021-04-26 DIAGNOSIS — Z23 ENCOUNTER FOR IMMUNIZATION: ICD-10-CM

## 2021-04-26 PROCEDURE — 36415 COLL VENOUS BLD VENIPUNCTURE: CPT

## 2021-04-26 PROCEDURE — 99214 OFFICE O/P EST MOD 30 MIN: CPT | Mod: 25

## 2021-04-26 PROCEDURE — 99072 ADDL SUPL MATRL&STAF TM PHE: CPT

## 2021-04-26 RX ORDER — FLUTICASONE FUROATE AND VILANTEROL TRIFENATATE 200; 25 UG/1; UG/1
200-25 POWDER RESPIRATORY (INHALATION) DAILY
Qty: 1 | Refills: 5 | Status: DISCONTINUED | COMMUNITY
Start: 2021-02-12 | End: 2021-04-26

## 2021-04-26 RX ORDER — PREDNISONE 20 MG/1
20 TABLET ORAL DAILY
Qty: 10 | Refills: 0 | Status: DISCONTINUED | COMMUNITY
Start: 2021-02-12 | End: 2021-04-26

## 2021-04-26 RX ORDER — AZITHROMYCIN 250 MG/1
250 TABLET, FILM COATED ORAL
Qty: 1 | Refills: 0 | Status: DISCONTINUED | COMMUNITY
Start: 2021-02-12 | End: 2021-04-26

## 2021-04-26 RX ORDER — EZETIMIBE 10 MG/1
10 TABLET ORAL
Refills: 0 | Status: ACTIVE | COMMUNITY
Start: 2021-04-26

## 2021-04-27 LAB
ALBUMIN SERPL ELPH-MCNC: 4.2 G/DL
ALP BLD-CCNC: 153 U/L
ALT SERPL-CCNC: 49 U/L
ANION GAP SERPL CALC-SCNC: 12 MMOL/L
AST SERPL-CCNC: 36 U/L
BASOPHILS # BLD AUTO: 0.04 K/UL
BASOPHILS NFR BLD AUTO: 0.6 %
BILIRUB SERPL-MCNC: 0.2 MG/DL
BUN SERPL-MCNC: 13 MG/DL
CALCIUM SERPL-MCNC: 9.9 MG/DL
CHLORIDE SERPL-SCNC: 104 MMOL/L
CO2 SERPL-SCNC: 24 MMOL/L
CREAT SERPL-MCNC: 1 MG/DL
EOSINOPHIL # BLD AUTO: 0.09 K/UL
EOSINOPHIL NFR BLD AUTO: 1.3 %
FERRITIN SERPL-MCNC: 47 NG/ML
GGT SERPL-CCNC: 79 U/L
GLUCOSE SERPL-MCNC: 109 MG/DL
HCT VFR BLD CALC: 36.2 %
HGB BLD-MCNC: 10.4 G/DL
IMM GRANULOCYTES NFR BLD AUTO: 0.6 %
IRON SATN MFR SERPL: 51 %
IRON SERPL-MCNC: 262 UG/DL
LYMPHOCYTES # BLD AUTO: 1.07 K/UL
LYMPHOCYTES NFR BLD AUTO: 15.4 %
MAN DIFF?: NORMAL
MCHC RBC-ENTMCNC: 23.3 PG
MCHC RBC-ENTMCNC: 28.7 GM/DL
MCV RBC AUTO: 81.2 FL
MONOCYTES # BLD AUTO: 0.55 K/UL
MONOCYTES NFR BLD AUTO: 7.9 %
NEUTROPHILS # BLD AUTO: 5.17 K/UL
NEUTROPHILS NFR BLD AUTO: 74.2 %
PLATELET # BLD AUTO: 292 K/UL
POTASSIUM SERPL-SCNC: 4.6 MMOL/L
PROT SERPL-MCNC: 7.3 G/DL
RBC # BLD: 4.46 M/UL
RBC # FLD: 19.3 %
SODIUM SERPL-SCNC: 140 MMOL/L
TIBC SERPL-MCNC: 519 UG/DL
UIBC SERPL-MCNC: 257 UG/DL
WBC # FLD AUTO: 6.96 K/UL

## 2021-04-27 NOTE — HISTORY OF PRESENT ILLNESS
[FreeTextEntry1] : Labs obtained on 4/12/21 as per her cardiologist. CBC demonstrates an H&H of 9.7 over 33.6 with a normal MCV of 83. Her liver enzymes were also noted to be elevated with an AST/ALT of 49/53 and an ALP of 131.  [de-identified] : -PMH: HLD, COPD, Strong FH of Heart Dz\par -SH: . Employed. Former smoker (Quit 2010). Occasional EtOH use\par \par SHEFALI is a 60 year F whom is here today for f/u abnl labs noted by her cardiologist\par Labs obtained on 4/12/21 as per her cardiologist. CBC demonstrates an H&H of 9.7 over 33.6 with a normal MCV of 83. Her liver enzymes were also noted to be elevated with an AST/ALT of 49/53 and an ALP of 131. \par Today, pt reports feeling well and is w/o complaints. \par \par -Anemia: Denies melena, Hematochezia, increasing fatigue. She does admit to muscle cramping in her legs. Denies increasing SOB, palpitations, abdominal pain, VB. Last Colonoscopy 2018 and was advised to repeat in 3 yrs due to h/o polyps. Denies Chronic NSAID use. She stopped her aspirin recently. \par -Elevated LFTs: Pt no longer on Statin therapy due presumed DILI. She is on Ezetimibe. She denies any recent ABx or new herbal supplements. \par \par -COPD: Quit in 2010. Follow w/ Pulm yearly for routine f/u and repeat CT Chest for Lung CA Screening. No other reported changes. \par -HLD: No longer on Atorvastatin 40mg HS & Vascepa 2g BID. No reported changes. \par -Osteopenia: (2/12/21) DEXA: Osteopenia w/ lowest Tscore of -2.2 left femoral neck. Normal FRAX. Remains on Vit-D. 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

## 2021-04-27 NOTE — ADDENDUM
[FreeTextEntry1] : Patient's hemoglobin slightly improved from labs obtained on 4/12. Hemoglobin now 10.4 up from 9.7. Patient's iron studies are WNL. Recommend she discontinue iron supplementation. Patient to followup with GI on 5/12 to discuss having patient obtained a colonoscopy to evaluate for underlying anemia related to GI loss. Patient to follow up with me in 1 weeks for repeat CBC. In the meantime okay to restart aspirin.\par \par With regards to her liver enzymes they remain elevated. Which I also recommend she touch base with GI about.

## 2021-04-27 NOTE — ASSESSMENT
[FreeTextEntry1] : -PMH: HLD, COPD, Strong FH of Heart Dz, Osteopenia\par -SH: . Employed. Former smoker (Quit 2010). Occasional EtOH use\par \par SHEFALI is a 60 year F whom is here today for f/u anemia & Elevated \par \par Specialists Involved:\par -Cardio: Dr. Pelaez\par -OBGYN: Dr. Jameson\par -Pulm: Dr. Peña\par -GI: Dr. Ram\par \par -F/u labs drawn in office today\par -Further recs pending lab results\par -Continue f/u w/ Cardio (HLD, Strong FH)\par -Continue yearly f/u w/ Pulm (COPD, Lung CA Screening)\par -F/u w/ Ins Co (Vaccine Coverage; PPSV23, Shingrix)\par -F/u w/ GI (Repeat Colonscopy)\par -F/u Gyn (Pap)\par -RTO 6mo for routine f/u & labs or sooner if needed.

## 2021-04-28 ENCOUNTER — NON-APPOINTMENT (OUTPATIENT)
Age: 61
End: 2021-04-28

## 2021-05-06 ENCOUNTER — LABORATORY RESULT (OUTPATIENT)
Age: 61
End: 2021-05-06

## 2021-05-06 ENCOUNTER — APPOINTMENT (OUTPATIENT)
Dept: INTERNAL MEDICINE | Facility: CLINIC | Age: 61
End: 2021-05-06
Payer: COMMERCIAL

## 2021-05-06 VITALS
HEART RATE: 96 BPM | DIASTOLIC BLOOD PRESSURE: 80 MMHG | WEIGHT: 135 LBS | SYSTOLIC BLOOD PRESSURE: 122 MMHG | TEMPERATURE: 97 F | HEIGHT: 64 IN | RESPIRATION RATE: 16 BRPM | BODY MASS INDEX: 23.05 KG/M2 | OXYGEN SATURATION: 97 %

## 2021-05-06 PROCEDURE — 99213 OFFICE O/P EST LOW 20 MIN: CPT | Mod: 25

## 2021-05-06 PROCEDURE — 36415 COLL VENOUS BLD VENIPUNCTURE: CPT

## 2021-05-06 PROCEDURE — 99072 ADDL SUPL MATRL&STAF TM PHE: CPT

## 2021-05-06 NOTE — HISTORY OF PRESENT ILLNESS
[FreeTextEntry1] : anemia [de-identified] : -PMH: HLD, COPD, Strong FH of Heart Dz\par -SH: . Employed. Former smoker (Quit 2010). Occasional EtOH use\par \par SHEFALI is a 60 year F whom is here today for f/u abnl labs\par Pt was seen by me on 4/26/21 and repeat labs were obtained at that time\par Labs at kaitlin time demonstrated that her hemoglobin slightly improved from labs obtained on 4/12. Hemoglobin now 10.4 up from 9.7. Patient's iron studies were all normal and advised her to DC iron sup and to resume her Aspirin. Patient also advised to followup with GI on 5/12 to discuss having patient obtain a repeat colonoscopy to evaluate for underlying anemia related to GI loss. \par With regards to her liver enzymes they remain elevated. Which I also recommend she touch base with GI about.\par \par -Anemia: Denies melena, Hematochezia, increasing fatigue. She does admit to muscle cramping in her legs. Denies increasing SOB, palpitations, abdominal pain, VB. Last Colonoscopy 2018 and was advised to repeat in 3 yrs due to h/o polyps. Denies Chronic NSAID use. \par -Elevated LFTs: No longer on Statin therapy due presumed DILI. She is on Ezetimibe. \par \par -COPD: Quit in 2010. Follow w/ Pulm yearly for routine f/u and repeat CT Chest for Lung CA Screening. No other reported changes. \par -HLD: No longer on Atorvastatin 40mg HS & Vascepa 2g BID. No reported changes. \par -Osteopenia: (2/12/21) DEXA: Osteopenia w/ lowest Tscore of -2.2 left femoral neck. Normal FRAX. Remains on Vit-D. 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

## 2021-05-06 NOTE — ASSESSMENT
[FreeTextEntry1] : -PMH: HLD, COPD, Strong FH of Heart Dz, Osteopenia\par -SH: . Employed. Former smoker (Quit 2010). Occasional EtOH use\par \par SHEFALI is a 60 year F whom is here today for f/u anemia \par \par Specialists Involved:\par -Cardio: Dr. Pelaez\par -OBGYN: Dr. Jameson\par -Pulm: Dr. Peña\par -GI: Dr. Ram\par \par -F/u labs drawn in office today\par -Further recs pending lab results\par -Continue f/u w/ Cardio (HLD, Strong FH)\par -Continue yearly f/u w/ Pulm (COPD, Lung CA Screening)\par -F/u w/ Ins Co (Vaccine Coverage; PPSV23, Shingrix)\par -F/u w/ GI (Repeat Colonscopy)\par -F/u Gyn (Pap)\par -RTO 6mo for routine f/u & labs or sooner if needed.

## 2021-05-07 LAB
BASOPHILS # BLD AUTO: 0.11 K/UL
BASOPHILS NFR BLD AUTO: 1.7 %
EOSINOPHIL # BLD AUTO: 0.17 K/UL
EOSINOPHIL NFR BLD AUTO: 2.6 %
HCT VFR BLD CALC: 35.6 %
HGB BLD-MCNC: 10 G/DL
LYMPHOCYTES # BLD AUTO: 1.07 K/UL
LYMPHOCYTES NFR BLD AUTO: 16.4 %
MAN DIFF?: NORMAL
MCHC RBC-ENTMCNC: 23.5 PG
MCHC RBC-ENTMCNC: 28.1 GM/DL
MCV RBC AUTO: 83.8 FL
MONOCYTES # BLD AUTO: 0.45 K/UL
MONOCYTES NFR BLD AUTO: 6.9 %
NEUTROPHILS # BLD AUTO: 4.71 K/UL
NEUTROPHILS NFR BLD AUTO: 72.4 %
PLATELET # BLD AUTO: 279 K/UL
RBC # BLD: 4.25 M/UL
RBC # FLD: 21.2 %
WBC # FLD AUTO: 6.51 K/UL

## 2021-05-12 ENCOUNTER — APPOINTMENT (OUTPATIENT)
Dept: GASTROENTEROLOGY | Facility: CLINIC | Age: 61
End: 2021-05-12
Payer: COMMERCIAL

## 2021-05-12 VITALS
WEIGHT: 134 LBS | TEMPERATURE: 97.6 F | DIASTOLIC BLOOD PRESSURE: 65 MMHG | HEART RATE: 85 BPM | RESPIRATION RATE: 14 BRPM | BODY MASS INDEX: 23 KG/M2 | OXYGEN SATURATION: 98 % | SYSTOLIC BLOOD PRESSURE: 153 MMHG

## 2021-05-12 DIAGNOSIS — R79.89 OTHER SPECIFIED ABNORMAL FINDINGS OF BLOOD CHEMISTRY: ICD-10-CM

## 2021-05-12 PROCEDURE — 99072 ADDL SUPL MATRL&STAF TM PHE: CPT

## 2021-05-12 PROCEDURE — 99214 OFFICE O/P EST MOD 30 MIN: CPT

## 2021-05-12 NOTE — HISTORY OF PRESENT ILLNESS
[de-identified] : Patient returns for evaluation for elevated liver chemistries and anemia.  She was last seen in November 2020, when it appeared she had a drug-induced liver injury.  Her liver chemistries normalized subsequently.  Recently, she has had a slightly elevated ALT and alkaline phosphatase.  Her iron deficiency anemia has significantly improved.

## 2021-05-12 NOTE — PHYSICAL EXAM
[General Appearance - Alert] : alert [General Appearance - In No Acute Distress] : in no acute distress [Sclera] : the sclera and conjunctiva were normal [PERRL With Normal Accommodation] : pupils were equal in size, round, and reactive to light [Extraocular Movements] : extraocular movements were intact [Outer Ear] : the ears and nose were normal in appearance [Hearing Threshold Finger Rub Not [place]] : hearing was normal [Neck Appearance] : the appearance of the neck was normal [Neck Cervical Mass (___cm)] : no neck mass was observed [Jugular Venous Distention Increased] : there was no jugular-venous distention [Thyroid Diffuse Enlargement] : the thyroid was not enlarged [Thyroid Nodule] : there were no palpable thyroid nodules [Auscultation Breath Sounds / Voice Sounds] : lungs were clear to auscultation bilaterally [Heart Sounds] : normal S1 and S2 [Heart Rate And Rhythm] : heart rate was normal and rhythm regular [Heart Sounds Gallop] : no gallops [Murmurs] : no murmurs [Heart Sounds Pericardial Friction Rub] : no pericardial rub [Edema] : there was no peripheral edema [Bowel Sounds] : normal bowel sounds [Abdomen Soft] : soft [Abdomen Tenderness] : non-tender [] : no hepato-splenomegaly [Abdomen Mass (___ Cm)] : no abdominal mass palpated [Cervical Lymph Nodes Enlarged Posterior Bilaterally] : posterior cervical [Cervical Lymph Nodes Enlarged Anterior Bilaterally] : anterior cervical [Supraclavicular Lymph Nodes Enlarged Bilaterally] : supraclavicular [Nail Clubbing] : no clubbing  or cyanosis of the fingernails [Skin Color & Pigmentation] : normal skin color and pigmentation [Skin Turgor] : normal skin turgor [No Focal Deficits] : no focal deficits [Oriented To Time, Place, And Person] : oriented to person, place, and time [Impaired Insight] : insight and judgment were intact [Affect] : the affect was normal

## 2021-05-12 NOTE — ASSESSMENT
[FreeTextEntry1] : We will schedule EGD and colonoscopy as part of her iron deficiency anemia work-up.  Her slightly elevated liver enzymes are not terribly concerning and may be secondary to alcohol consumption and/or nonalcoholic fatty liver disease.  With their modest elevation, no further work-up indicated.

## 2021-07-12 ENCOUNTER — APPOINTMENT (OUTPATIENT)
Dept: DISASTER EMERGENCY | Facility: CLINIC | Age: 61
End: 2021-07-12

## 2021-07-13 LAB — SARS-COV-2 N GENE NPH QL NAA+PROBE: NOT DETECTED

## 2021-07-14 ENCOUNTER — TRANSCRIPTION ENCOUNTER (OUTPATIENT)
Age: 61
End: 2021-07-14

## 2021-07-15 ENCOUNTER — OUTPATIENT (OUTPATIENT)
Dept: OUTPATIENT SERVICES | Facility: HOSPITAL | Age: 61
LOS: 1 days | End: 2021-07-15
Payer: COMMERCIAL

## 2021-07-15 ENCOUNTER — RESULT REVIEW (OUTPATIENT)
Age: 61
End: 2021-07-15

## 2021-07-15 ENCOUNTER — APPOINTMENT (OUTPATIENT)
Dept: GASTROENTEROLOGY | Facility: GI CENTER | Age: 61
End: 2021-07-15
Payer: COMMERCIAL

## 2021-07-15 DIAGNOSIS — K57.30 DIVERTICULOSIS OF LARGE INTESTINE W/OUT PERFORATION OR ABSCESS W/OUT BLEEDING: ICD-10-CM

## 2021-07-15 DIAGNOSIS — K44.9 DIAPHRAGMATIC HERNIA W/OUT OBSTRUCTION OR GANGRENE: ICD-10-CM

## 2021-07-15 DIAGNOSIS — Z12.11 ENCOUNTER FOR SCREENING FOR MALIGNANT NEOPLASM OF COLON: ICD-10-CM

## 2021-07-15 DIAGNOSIS — D50.8 OTHER IRON DEFICIENCY ANEMIAS: ICD-10-CM

## 2021-07-15 DIAGNOSIS — K64.4 RESIDUAL HEMORRHOIDAL SKIN TAGS: ICD-10-CM

## 2021-07-15 PROCEDURE — 43239 EGD BIOPSY SINGLE/MULTIPLE: CPT

## 2021-07-15 PROCEDURE — 88305 TISSUE EXAM BY PATHOLOGIST: CPT | Mod: 26

## 2021-07-15 PROCEDURE — 45385 COLONOSCOPY W/LESION REMOVAL: CPT | Mod: 33

## 2021-07-15 PROCEDURE — 43239 EGD BIOPSY SINGLE/MULTIPLE: CPT | Mod: 59

## 2021-07-15 PROCEDURE — 45385 COLONOSCOPY W/LESION REMOVAL: CPT | Mod: PT

## 2021-07-15 PROCEDURE — 88305 TISSUE EXAM BY PATHOLOGIST: CPT

## 2021-07-15 RX ORDER — SODIUM SULFATE, MAGNESIUM SULFATE, AND POTASSIUM CHLORIDE 17.75; 2.7; 2.25 G/1; G/1; G/1
1479-225-188 TABLET ORAL
Qty: 1 | Refills: 0 | Status: COMPLETED | COMMUNITY
Start: 2021-05-12 | End: 2021-07-15

## 2021-07-15 NOTE — PHYSICAL EXAM
[General Appearance - Alert] : alert [General Appearance - In No Acute Distress] : in no acute distress [Sclera] : the sclera and conjunctiva were normal [PERRL With Normal Accommodation] : pupils were equal in size, round, and reactive to light [Extraocular Movements] : extraocular movements were intact [Outer Ear] : the ears and nose were normal in appearance [Hearing Threshold Finger Rub Not [place]] : hearing was normal [Neck Appearance] : the appearance of the neck was normal [Neck Cervical Mass (___cm)] : no neck mass was observed [Jugular Venous Distention Increased] : there was no jugular-venous distention [Thyroid Diffuse Enlargement] : the thyroid was not enlarged [Thyroid Nodule] : there were no palpable thyroid nodules [Auscultation Breath Sounds / Voice Sounds] : lungs were clear to auscultation bilaterally [Heart Rate And Rhythm] : heart rate was normal and rhythm regular [Heart Sounds] : normal S1 and S2 [Heart Sounds Gallop] : no gallops [Murmurs] : no murmurs [Heart Sounds Pericardial Friction Rub] : no pericardial rub [Edema] : there was no peripheral edema [Bowel Sounds] : normal bowel sounds [Abdomen Soft] : soft [Abdomen Tenderness] : non-tender [] : no hepato-splenomegaly [Abdomen Mass (___ Cm)] : no abdominal mass palpated [Cervical Lymph Nodes Enlarged Posterior Bilaterally] : posterior cervical [Cervical Lymph Nodes Enlarged Anterior Bilaterally] : anterior cervical [Supraclavicular Lymph Nodes Enlarged Bilaterally] : supraclavicular [Nail Clubbing] : no clubbing  or cyanosis of the fingernails [Skin Color & Pigmentation] : normal skin color and pigmentation [Skin Turgor] : normal skin turgor [No Focal Deficits] : no focal deficits [Oriented To Time, Place, And Person] : oriented to person, place, and time [Impaired Insight] : insight and judgment were intact [Affect] : the affect was normal

## 2021-07-19 LAB — SURGICAL PATHOLOGY STUDY: SIGNIFICANT CHANGE UP

## 2021-07-22 ENCOUNTER — NON-APPOINTMENT (OUTPATIENT)
Age: 61
End: 2021-07-22

## 2022-01-24 ENCOUNTER — APPOINTMENT (OUTPATIENT)
Dept: PULMONOLOGY | Facility: CLINIC | Age: 62
End: 2022-01-24
Payer: COMMERCIAL

## 2022-01-24 VITALS
SYSTOLIC BLOOD PRESSURE: 132 MMHG | DIASTOLIC BLOOD PRESSURE: 64 MMHG | BODY MASS INDEX: 22.2 KG/M2 | WEIGHT: 130 LBS | RESPIRATION RATE: 16 BRPM | HEART RATE: 71 BPM | HEIGHT: 64 IN | OXYGEN SATURATION: 98 %

## 2022-01-24 PROCEDURE — 99214 OFFICE O/P EST MOD 30 MIN: CPT | Mod: 25

## 2022-01-24 PROCEDURE — G0296 VISIT TO DETERM LDCT ELIG: CPT

## 2022-01-24 NOTE — HISTORY OF PRESENT ILLNESS
[Doing Well] : doing well [Wheezing] : denies wheezing [Regional Soft Tissue Swelling Both Lower Extremities] : denies lower extremity edema [>= 30 pack years] : >= 30 pack years [YearQuit] : 2011 [ESS] : 0 [Difficulty Breathing During Exertion] : stable dyspnea on exertion [Feelings Of Weakness On Exertion] : stable exercise intolerance [Cough] : denies coughing [Coughing Up Sputum] : denies coughing up sputum [Wt Gain ___ Lbs] : no recent weight gain [Wt Loss ___ Lbs] : no recent weight loss [Tobacco Use] : ~He/She~ does not use tobacco [Adherent] : the patient is not adherent with ~his/her~ medication regimen [de-identified] : lung nodules,sinusitis, anemia [de-identified] : Dyspnea has remained constant for past yr. No chest pain [FreeTextEntry8] : 10 yrs tobacco free [FreeTextEntry3] : self dc'ed Breo

## 2022-01-24 NOTE — PHYSICAL EXAM
[No Acute Distress] : no acute distress [Normal Oropharynx] : normal oropharynx [Normal Appearance] : normal appearance [No Neck Mass] : no neck mass [Normal Rate/Rhythm] : normal rate/rhythm [Normal S1, S2] : normal s1, s2 [No Murmurs] : no murmurs [No Abnormalities] : no abnormalities [Benign] : benign [Normal Gait] : normal gait [No Clubbing] : no clubbing [No Cyanosis] : no cyanosis [No Edema] : no edema [FROM] : FROM [Normal Color/ Pigmentation] : normal color/ pigmentation [No Focal Deficits] : no focal deficits [Oriented x3] : oriented x3 [Normal Affect] : normal affect [TextBox_68] : Decreased breath sounds bilateral

## 2022-01-24 NOTE — DISCUSSION/SUMMARY
[COPD] : chronic obstructive pulmonary disease [Decompensated] : decompensated [Stage II (Moderate)] : stage II (moderate) [Medication Changes Per Orders] : Medication changes are as documented in orders [de-identified] : Deconditioning, CAD [FreeTextEntry1] : Due to the patient's history of prior tobacco use they fulfill criteria for low dose, lung cancer screening. This method was described to the patient with criteria for greater than 20 pack years of smoking, over the age of 50, and screening for 15 years following cessation of tobacco use or until age 80\par

## 2022-02-25 LAB — SARS-COV-2 N GENE NPH QL NAA+PROBE: NOT DETECTED

## 2022-03-01 ENCOUNTER — APPOINTMENT (OUTPATIENT)
Dept: PULMONOLOGY | Facility: CLINIC | Age: 62
End: 2022-03-01
Payer: COMMERCIAL

## 2022-03-01 VITALS — SYSTOLIC BLOOD PRESSURE: 140 MMHG | OXYGEN SATURATION: 97 % | DIASTOLIC BLOOD PRESSURE: 70 MMHG | HEART RATE: 109 BPM

## 2022-03-01 DIAGNOSIS — R93.89 ABNORMAL FINDINGS ON DIAGNOSTIC IMAGING OF OTHER SPECIFIED BODY STRUCTURES: ICD-10-CM

## 2022-03-01 PROCEDURE — 99215 OFFICE O/P EST HI 40 MIN: CPT | Mod: 25

## 2022-03-01 PROCEDURE — G0296 VISIT TO DETERM LDCT ELIG: CPT

## 2022-03-01 NOTE — HISTORY OF PRESENT ILLNESS
[>= 30 pack years] : >= 30 pack years [Doing Well] : doing well [Cough] : denies coughing [Coughing Up Sputum] : denies coughing up sputum [Wheezing] : denies wheezing [Regional Soft Tissue Swelling Both Lower Extremities] : denies lower extremity edema [FreeTextEntry3] : self dc'ed Breo [YearQuit] : 2011 [ESS] : 0 [Difficulty Breathing During Exertion] : improved dyspnea on exertion [Feelings Of Weakness On Exertion] : improved exercise intolerance [Wt Gain ___ Lbs] : no recent weight gain [Wt Loss ___ Lbs] : no recent weight loss [Tobacco Use] : ~He/She~ does not use tobacco [Adherent] : the patient is adherent with ~his/her~ medication regimen [de-identified] : lung nodules,sinusitis, anemia [de-identified] : Resumed Breo [FreeTextEntry8] : 10 yrs tobacco free

## 2022-03-01 NOTE — DISCUSSION/SUMMARY
[COPD] : chronic obstructive pulmonary disease [Stage II (Moderate)] : stage II (moderate) [Medication Changes Per Orders] : Medication changes are as documented in orders [FreeTextEntry1] : Due to the patient's history of prior tobacco use they fulfill criteria for low dose, lung cancer screening. This method was described to the patient with criteria for greater than 20 pack years of smoking, over the age of 50, and screening for 15 years following cessation of tobacco use or until age 80\par  [Improving] : improving [Responding to Treatment] : responding to treatment [de-identified] : Deconditioning, CAD

## 2022-03-01 NOTE — END OF VISIT
[FreeTextEntry3] : pacs rev with patient [Time Spent: ___ minutes] : I have spent [unfilled] minutes of time on the encounter.

## 2022-05-02 ENCOUNTER — EMERGENCY (EMERGENCY)
Facility: HOSPITAL | Age: 62
LOS: 1 days | Discharge: HOME | End: 2022-05-02
Attending: STUDENT IN AN ORGANIZED HEALTH CARE EDUCATION/TRAINING PROGRAM
Payer: COMMERCIAL

## 2022-05-02 VITALS
SYSTOLIC BLOOD PRESSURE: 184 MMHG | RESPIRATION RATE: 18 BRPM | HEART RATE: 96 BPM | OXYGEN SATURATION: 98 % | TEMPERATURE: 99 F | HEIGHT: 64 IN | DIASTOLIC BLOOD PRESSURE: 83 MMHG | WEIGHT: 136.03 LBS

## 2022-05-02 PROCEDURE — 99283 EMERGENCY DEPT VISIT LOW MDM: CPT

## 2022-05-02 PROCEDURE — 99283 EMERGENCY DEPT VISIT LOW MDM: CPT | Mod: 25

## 2022-05-02 PROCEDURE — 99242 OFF/OP CONSLTJ NEW/EST SF 20: CPT

## 2022-05-02 PROCEDURE — 73130 X-RAY EXAM OF HAND: CPT | Mod: 26,LT

## 2022-05-02 PROCEDURE — 73130 X-RAY EXAM OF HAND: CPT

## 2022-05-02 PROCEDURE — 90715 TDAP VACCINE 7 YRS/> IM: CPT

## 2022-05-02 PROCEDURE — 90471 IMMUNIZATION ADMIN: CPT

## 2022-05-02 RX ORDER — TETANUS TOXOID, REDUCED DIPHTHERIA TOXOID AND ACELLULAR PERTUSSIS VACCINE, ADSORBED 5; 2.5; 8; 8; 2.5 [IU]/.5ML; [IU]/.5ML; UG/.5ML; UG/.5ML; UG/.5ML
0.5 SUSPENSION INTRAMUSCULAR ONCE
Refills: 0 | Status: COMPLETED | OUTPATIENT
Start: 2022-05-02 | End: 2022-05-02

## 2022-05-02 RX ORDER — AZITHROMYCIN 500 MG/1
1 TABLET, FILM COATED ORAL
Qty: 1 | Refills: 0
Start: 2022-05-02 | End: 2022-05-06

## 2022-05-02 RX ORDER — IBUPROFEN 200 MG
600 TABLET ORAL ONCE
Refills: 0 | Status: COMPLETED | OUTPATIENT
Start: 2022-05-02 | End: 2022-05-02

## 2022-05-02 RX ADMIN — Medication 600 MILLIGRAM(S): at 09:16

## 2022-05-02 RX ADMIN — TETANUS TOXOID, REDUCED DIPHTHERIA TOXOID AND ACELLULAR PERTUSSIS VACCINE, ADSORBED 0.5 MILLILITER(S): 5; 2.5; 8; 8; 2.5 SUSPENSION INTRAMUSCULAR at 09:15

## 2022-05-02 NOTE — ED PROVIDER NOTE - ATTENDING APP SHARED VISIT CONTRIBUTION OF CARE
62 YOF here with cat scratch to left hand from her own cat while she was sleeping. reports hand was dangling off bed overnight when she sustained injury. denies fever, chills. Pt unknown last tetanus.   AP - no signs of ascending infection at this time. well appearing. update tetanus. check xr. abx. return precautions for worsening symptoms discussed

## 2022-05-02 NOTE — ED PROVIDER NOTE - PATIENT PORTAL LINK FT
You can access the FollowMyHealth Patient Portal offered by [facility] by registering at the following website: [email or web address]. By joining [employer]’s FollowMyHealth portal, you will also be able to view your health information using other applications (apps) compatible with our system.

## 2022-05-02 NOTE — ED ADULT TRIAGE NOTE - CHIEF COMPLAINT QUOTE
patient had left hand scratched by house cat while dangling hand off the bed. patient stated that cat is upto date with all vaccines.

## 2022-05-02 NOTE — ED PROVIDER NOTE - SKIN, MLM
Skin normal color for race, warm, dry and intact. No evidence of rash. +<1 cm linear superficial laceration of LUE dorsal hand, bleeding controlled, + associated ecchymosis, no erythema no discharge, no streaking

## 2022-05-02 NOTE — ED PROVIDER NOTE - NSFOLLOWUPINSTRUCTIONS_ED_ALL_ED_FT
1. TAKE ALL MEDICATIONS AS DIRECTED.  FOR PAIN YOU CAN TAKE IBUPROFEN (MOTRIN, ADVIL) OR ACETAMINOPHEN (TYLENOL) AS NEEDED, AS DIRECTED ON PACKAGING.  2. FOLLOW UP WITH _PMD_________ AS DIRECTED.  YOU WERE GIVEN COPIES OF ALL LABS AND IMAGING RESULTS FROM YOUR ER VISIT--PLEASE TAKE THEM WITH YOU TO YOUR APPOINTMENT.  3. IF NEEDED, CALL [phone] TO FIND A PRIMARY CARE PHYSICIAN.  OR CALL [phone] TO MAKE AN APPOINTMENT WITH THE MEDICAL CLINIC.  4. RETURN TO THE ER FOR ANY WORSENING SYMPTOMS.

## 2022-05-02 NOTE — ED ADULT NURSE NOTE - OBJECTIVE STATEMENT
pt care assumed at 0910, no apparent distress noted at this time. pt received Alert and Oriented to person, place, situation and time sitting in bed comfortably. pt c/o cat scratch on her anterior left hand that happened at 2am this morning. pt denies cat bite. pt states cat is UTD w/ vaccines. there is swelling noted w/ 2 small lacerations approx 2-3cm each. no bleeding at this time. pt states pain is radiating up her arm.

## 2022-05-02 NOTE — ED PROVIDER NOTE - PROGRESS NOTE DETAILS
pt wound cleanded and dressed Acute Ed read of Xray shows no acute fractures/ dislocation. PT aware if secondary reading of imaging changes they will be notified.

## 2022-05-02 NOTE — ED PROVIDER NOTE - NS ED ATTENDING STATEMENT MOD
This was a shared visit with the CARMITA. I reviewed and verified the documentation and independently performed the documented:

## 2022-05-02 NOTE — ED PROVIDER NOTE - OBJECTIVE STATEMENT
63 yo F Pt, presents to ED complaining of cat scratch on L hand x 12 hrs. PT states her cat scratched her R hand, while she was sleeping. Pt admits to 7/10 L hand pain, with associated bleeding and bruising. Pt admits to worsening pain with ROM of digits. Pt denies fever, chills, redness, discharge, numbness, tingling, weakness, and any other acute symptoms at this time. Pt unknown last tetanus

## 2022-05-02 NOTE — ED PROVIDER NOTE - CLINICAL SUMMARY MEDICAL DECISION MAKING FREE TEXT BOX
scratched by cat, will give medication for pain, obtain xray, adacel, rx abx, and refer to PMD for follow up

## 2022-05-03 ENCOUNTER — EMERGENCY (EMERGENCY)
Facility: HOSPITAL | Age: 62
LOS: 1 days | Discharge: HOME | End: 2022-05-03
Attending: EMERGENCY MEDICINE
Payer: COMMERCIAL

## 2022-05-03 ENCOUNTER — TRANSCRIPTION ENCOUNTER (OUTPATIENT)
Age: 62
End: 2022-05-03

## 2022-05-03 VITALS
SYSTOLIC BLOOD PRESSURE: 161 MMHG | RESPIRATION RATE: 16 BRPM | WEIGHT: 134.04 LBS | DIASTOLIC BLOOD PRESSURE: 77 MMHG | HEIGHT: 64 IN | TEMPERATURE: 100 F | OXYGEN SATURATION: 97 % | HEART RATE: 101 BPM

## 2022-05-03 DIAGNOSIS — Z98.890 OTHER SPECIFIED POSTPROCEDURAL STATES: Chronic | ICD-10-CM

## 2022-05-03 LAB
ALBUMIN SERPL ELPH-MCNC: 4 G/DL — SIGNIFICANT CHANGE UP (ref 3.3–5.2)
ALP SERPL-CCNC: 121 U/L — HIGH (ref 40–120)
ALT FLD-CCNC: 19 U/L — SIGNIFICANT CHANGE UP
ANION GAP SERPL CALC-SCNC: 13 MMOL/L — SIGNIFICANT CHANGE UP (ref 5–17)
ANISOCYTOSIS BLD QL: SLIGHT — SIGNIFICANT CHANGE UP
AST SERPL-CCNC: 20 U/L — SIGNIFICANT CHANGE UP
BASOPHILS # BLD AUTO: 0.06 K/UL — SIGNIFICANT CHANGE UP (ref 0–0.2)
BASOPHILS NFR BLD AUTO: 0.9 % — SIGNIFICANT CHANGE UP (ref 0–2)
BILIRUB SERPL-MCNC: 0.4 MG/DL — SIGNIFICANT CHANGE UP (ref 0.4–2)
BUN SERPL-MCNC: 17.2 MG/DL — SIGNIFICANT CHANGE UP (ref 8–20)
CALCIUM SERPL-MCNC: 9.4 MG/DL — SIGNIFICANT CHANGE UP (ref 8.6–10.2)
CHLORIDE SERPL-SCNC: 102 MMOL/L — SIGNIFICANT CHANGE UP (ref 98–107)
CO2 SERPL-SCNC: 22 MMOL/L — SIGNIFICANT CHANGE UP (ref 22–29)
CREAT SERPL-MCNC: 0.72 MG/DL — SIGNIFICANT CHANGE UP (ref 0.5–1.3)
CRP SERPL-MCNC: 60 MG/L — HIGH
EGFR: 93 ML/MIN/1.73M2 — SIGNIFICANT CHANGE UP
EOSINOPHIL # BLD AUTO: 0.36 K/UL — SIGNIFICANT CHANGE UP (ref 0–0.5)
EOSINOPHIL NFR BLD AUTO: 5.2 % — SIGNIFICANT CHANGE UP (ref 0–6)
ERYTHROCYTE [SEDIMENTATION RATE] IN BLOOD: 23 MM/HR — HIGH (ref 0–20)
GIANT PLATELETS BLD QL SMEAR: PRESENT — SIGNIFICANT CHANGE UP
GLUCOSE SERPL-MCNC: 118 MG/DL — HIGH (ref 70–99)
HCT VFR BLD CALC: 32 % — LOW (ref 34.5–45)
HGB BLD-MCNC: 9.4 G/DL — LOW (ref 11.5–15.5)
LYMPHOCYTES # BLD AUTO: 0.72 K/UL — LOW (ref 1–3.3)
LYMPHOCYTES # BLD AUTO: 10.4 % — LOW (ref 13–44)
MANUAL SMEAR VERIFICATION: SIGNIFICANT CHANGE UP
MCHC RBC-ENTMCNC: 21.9 PG — LOW (ref 27–34)
MCHC RBC-ENTMCNC: 29.4 GM/DL — LOW (ref 32–36)
MCV RBC AUTO: 74.6 FL — LOW (ref 80–100)
MICROCYTES BLD QL: SLIGHT — SIGNIFICANT CHANGE UP
MONOCYTES # BLD AUTO: 0.06 K/UL — SIGNIFICANT CHANGE UP (ref 0–0.9)
MONOCYTES NFR BLD AUTO: 0.9 % — LOW (ref 2–14)
NEUTROPHILS # BLD AUTO: 5.72 K/UL — SIGNIFICANT CHANGE UP (ref 1.8–7.4)
NEUTROPHILS NFR BLD AUTO: 82.6 % — HIGH (ref 43–77)
PLAT MORPH BLD: NORMAL — SIGNIFICANT CHANGE UP
PLATELET # BLD AUTO: 251 K/UL — SIGNIFICANT CHANGE UP (ref 150–400)
POLYCHROMASIA BLD QL SMEAR: SLIGHT — SIGNIFICANT CHANGE UP
POTASSIUM SERPL-MCNC: 3.6 MMOL/L — SIGNIFICANT CHANGE UP (ref 3.5–5.3)
POTASSIUM SERPL-SCNC: 3.6 MMOL/L — SIGNIFICANT CHANGE UP (ref 3.5–5.3)
PROT SERPL-MCNC: 7 G/DL — SIGNIFICANT CHANGE UP (ref 6.6–8.7)
RBC # BLD: 4.29 M/UL — SIGNIFICANT CHANGE UP (ref 3.8–5.2)
RBC # FLD: 18.3 % — HIGH (ref 10.3–14.5)
RBC BLD AUTO: ABNORMAL
SODIUM SERPL-SCNC: 137 MMOL/L — SIGNIFICANT CHANGE UP (ref 135–145)
WBC # BLD: 6.93 K/UL — SIGNIFICANT CHANGE UP (ref 3.8–10.5)
WBC # FLD AUTO: 6.93 K/UL — SIGNIFICANT CHANGE UP (ref 3.8–10.5)

## 2022-05-03 PROCEDURE — 99220: CPT

## 2022-05-03 PROCEDURE — 73130 X-RAY EXAM OF HAND: CPT | Mod: 26,LT

## 2022-05-03 RX ORDER — AMPICILLIN SODIUM AND SULBACTAM SODIUM 250; 125 MG/ML; MG/ML
INJECTION, POWDER, FOR SUSPENSION INTRAMUSCULAR; INTRAVENOUS
Refills: 0 | Status: DISCONTINUED | OUTPATIENT
Start: 2022-05-03 | End: 2022-05-04

## 2022-05-03 RX ORDER — AMPICILLIN SODIUM AND SULBACTAM SODIUM 250; 125 MG/ML; MG/ML
3 INJECTION, POWDER, FOR SUSPENSION INTRAMUSCULAR; INTRAVENOUS EVERY 6 HOURS
Refills: 0 | Status: DISCONTINUED | OUTPATIENT
Start: 2022-05-04 | End: 2022-05-04

## 2022-05-03 RX ORDER — AMPICILLIN SODIUM AND SULBACTAM SODIUM 250; 125 MG/ML; MG/ML
3 INJECTION, POWDER, FOR SUSPENSION INTRAMUSCULAR; INTRAVENOUS ONCE
Refills: 0 | Status: COMPLETED | OUTPATIENT
Start: 2022-05-03 | End: 2022-05-03

## 2022-05-03 RX ORDER — LACTOBACILLUS ACIDOPHILUS 100MM CELL
1 CAPSULE ORAL DAILY
Refills: 0 | Status: DISCONTINUED | OUTPATIENT
Start: 2022-05-03 | End: 2022-05-08

## 2022-05-03 RX ADMIN — Medication 1 TABLET(S): at 23:31

## 2022-05-03 RX ADMIN — AMPICILLIN SODIUM AND SULBACTAM SODIUM 200 GRAM(S): 250; 125 INJECTION, POWDER, FOR SUSPENSION INTRAMUSCULAR; INTRAVENOUS at 23:31

## 2022-05-03 RX ADMIN — AMPICILLIN SODIUM AND SULBACTAM SODIUM 200 GRAM(S): 250; 125 INJECTION, POWDER, FOR SUSPENSION INTRAMUSCULAR; INTRAVENOUS at 20:10

## 2022-05-03 NOTE — ED PROVIDER NOTE - NSICDXFAMILYHX_GEN_ALL_CORE_FT
FAMILY HISTORY:  No pertinent family history in first degree relatives FAMILY HISTORY:  Father  Still living? Unknown  FH: diabetes mellitus, Age at diagnosis: Age Unknown  FH: myocardial infarction, Age at diagnosis: Age Unknown    Sibling  Still living? Unknown  FH: diabetes mellitus, Age at diagnosis: Age Unknown  FH: myocardial infarction, Age at diagnosis: Age Unknown

## 2022-05-03 NOTE — ED CDU PROVIDER INITIAL DAY NOTE - ATTENDING APP SHARED VISIT CONTRIBUTION OF CARE
63 yo female PMHx HLD presents to ED c/o hand pain/swelling x2 days after starting a zpack for unclear if cat scratch or bite, worsenign redness and pain, in obs of IV abx, ID consult

## 2022-05-03 NOTE — ED PROVIDER NOTE - NSICDXPASTSURGICALHX_GEN_ALL_CORE_FT
PAST SURGICAL HISTORY:  No significant past surgical history PAST SURGICAL HISTORY:  H/O carpal tunnel repair     S/P hernia repair

## 2022-05-03 NOTE — ED CDU PROVIDER INITIAL DAY NOTE - OBJECTIVE STATEMENT
61 yo female PMHx HLD presents to ED c/o hand pain/swelling x2 days. Patient reports woke up from sleep from sharp pain to hand. Unsure if cat either bit or scratched. Presented to ED yesterday, prescribed Zpack and Tetanus updated. Presented today for worsening swelling/redness. No further complaints at this time.  Denies fever. 61 yo female PMHx HLD presents to ED c/o hand pain/swelling x2 days. Patient reports woke up from sleep from sharp pain to hand. Unsure if cat either bit or scratched. Presented to ED yesterday, prescribed Zpack and Tetanus updated. Presented today for worsening swelling/redness. Right hand dominant. No further complaints at this time.  Denies fever.

## 2022-05-03 NOTE — ED ADULT TRIAGE NOTE - CHIEF COMPLAINT QUOTE
Pt was scratched by her cat on her left hand 2 days ago. Pt with swelling, pain and redness to hand and was seen here yesterday. Started on abx but redness and swelling are not going up her arm

## 2022-05-03 NOTE — ED CDU PROVIDER INITIAL DAY NOTE - PHYSICAL EXAMINATION
Gen: Nontoxic, well appearing, in NAD.  Skin: Warm and dry as visualized. Left hand will swelling and erythema. Erythema extends to mid forearm. Two scabbed abrasion to hand.   Head: NC/AT.  Eyes: PERRLA. EOMI.  Neck: Supple, FROM. No LAD.   Resp: No distress.  Cardio: Well perfused.  Ext: No deformities. MAEx4. Left hand able to oppose 1st and 2nd digit, limited ROM digits 3-5.   Neuro: A&Ox3. Sensation intact.   Psych: Normal affect and mood. Gen: Nontoxic, well appearing, in NAD.  Skin: Warm and dry as visualized. Left hand will swelling and erythema. Erythema extends to mid forearm. Noncircumferential. Two scabbed abrasion to hand.   Head: NC/AT.  Eyes: PERRLA. EOMI.  Neck: Supple, FROM. No LAD.   Resp: No distress.  Cardio: Well perfused.  Ext: No deformities. MAEx4. Left hand able to oppose 1st and 2nd digit, limited ROM digits 3-5.   Neuro: A&Ox3. Sensation intact.   Psych: Normal affect and mood. Gen: Nontoxic, well appearing, in NAD.  Skin: Warm and dry as visualized. Left hand will swelling and erythema. Erythema extends to mid forearm. Noncircumferential. Two scabbed abrasion to hand. No crepitus or drainable collection.   Head: NC/AT.  Eyes: PERRLA. EOMI.  Neck: Supple, FROM. No LAD.   Resp: No distress.  Cardio: Well perfused.  Ext: No deformities. MAEx4. Left hand able to oppose 1st and 2nd digit, limited ROM digits 3-5.   Neuro: A&Ox3. Sensation intact.   Psych: Normal affect and mood.

## 2022-05-03 NOTE — ED CDU PROVIDER INITIAL DAY NOTE - MEDICAL DECISION MAKING DETAILS
61 yo female PMHx HLD presents to ED with hand/wrist cellulitis following cat bite/scratch. Completed two doses of Azithromycin without improvement. Afebrile, no leukocytosis. Receiving Unasyn. Placed in CDU continued IV management, ID, and hand consult. Further management plan pending clinical course consult recommendations.

## 2022-05-03 NOTE — ED PROVIDER NOTE - PROGRESS NOTE DETAILS
GUY Marsh: Patient evaluated by intake physician. HPI/ROS/PE as noted above. Will follow up plan per intake physician and continue to assess patient. Left hand swelling/erythema extending beyond wrist. Prescribed Zpack 5/2. Agrees to CDU for IV abx.

## 2022-05-03 NOTE — ED ADULT NURSE NOTE - OBJECTIVE STATEMENT
Pt A&OX3, states she came to ED yesterday for left hand redness and swelling from cat scratch 2 days ago.  Pt now c/o increased redness and increased swelling.  Pt denies any fever.

## 2022-05-04 VITALS
OXYGEN SATURATION: 95 % | RESPIRATION RATE: 17 BRPM | TEMPERATURE: 99 F | DIASTOLIC BLOOD PRESSURE: 78 MMHG | SYSTOLIC BLOOD PRESSURE: 163 MMHG | HEART RATE: 70 BPM

## 2022-05-04 LAB
BASOPHILS # BLD AUTO: 0.02 K/UL — SIGNIFICANT CHANGE UP (ref 0–0.2)
BASOPHILS NFR BLD AUTO: 0.3 % — SIGNIFICANT CHANGE UP (ref 0–2)
EOSINOPHIL # BLD AUTO: 0.21 K/UL — SIGNIFICANT CHANGE UP (ref 0–0.5)
EOSINOPHIL NFR BLD AUTO: 3.5 % — SIGNIFICANT CHANGE UP (ref 0–6)
HCT VFR BLD CALC: 31.2 % — LOW (ref 34.5–45)
HGB BLD-MCNC: 9.1 G/DL — LOW (ref 11.5–15.5)
IMM GRANULOCYTES NFR BLD AUTO: 0.7 % — SIGNIFICANT CHANGE UP (ref 0–1.5)
LYMPHOCYTES # BLD AUTO: 0.69 K/UL — LOW (ref 1–3.3)
LYMPHOCYTES # BLD AUTO: 11.5 % — LOW (ref 13–44)
MCHC RBC-ENTMCNC: 21.5 PG — LOW (ref 27–34)
MCHC RBC-ENTMCNC: 29.2 GM/DL — LOW (ref 32–36)
MCV RBC AUTO: 73.8 FL — LOW (ref 80–100)
MONOCYTES # BLD AUTO: 0.7 K/UL — SIGNIFICANT CHANGE UP (ref 0–0.9)
MONOCYTES NFR BLD AUTO: 11.6 % — SIGNIFICANT CHANGE UP (ref 2–14)
NEUTROPHILS # BLD AUTO: 4.35 K/UL — SIGNIFICANT CHANGE UP (ref 1.8–7.4)
NEUTROPHILS NFR BLD AUTO: 72.4 % — SIGNIFICANT CHANGE UP (ref 43–77)
PLATELET # BLD AUTO: 224 K/UL — SIGNIFICANT CHANGE UP (ref 150–400)
RBC # BLD: 4.23 M/UL — SIGNIFICANT CHANGE UP (ref 3.8–5.2)
RBC # FLD: 18.4 % — HIGH (ref 10.3–14.5)
WBC # BLD: 6.01 K/UL — SIGNIFICANT CHANGE UP (ref 3.8–10.5)
WBC # FLD AUTO: 6.01 K/UL — SIGNIFICANT CHANGE UP (ref 3.8–10.5)

## 2022-05-04 PROCEDURE — 73130 X-RAY EXAM OF HAND: CPT

## 2022-05-04 PROCEDURE — 36415 COLL VENOUS BLD VENIPUNCTURE: CPT

## 2022-05-04 PROCEDURE — 96376 TX/PRO/DX INJ SAME DRUG ADON: CPT

## 2022-05-04 PROCEDURE — 80053 COMPREHEN METABOLIC PANEL: CPT

## 2022-05-04 PROCEDURE — 85652 RBC SED RATE AUTOMATED: CPT

## 2022-05-04 PROCEDURE — 99242 OFF/OP CONSLTJ NEW/EST SF 20: CPT

## 2022-05-04 PROCEDURE — 99283 EMERGENCY DEPT VISIT LOW MDM: CPT | Mod: 25

## 2022-05-04 PROCEDURE — 96374 THER/PROPH/DIAG INJ IV PUSH: CPT

## 2022-05-04 PROCEDURE — 99282 EMERGENCY DEPT VISIT SF MDM: CPT

## 2022-05-04 PROCEDURE — 99217: CPT

## 2022-05-04 PROCEDURE — G0378: CPT

## 2022-05-04 PROCEDURE — 85025 COMPLETE CBC W/AUTO DIFF WBC: CPT

## 2022-05-04 PROCEDURE — 86140 C-REACTIVE PROTEIN: CPT

## 2022-05-04 RX ADMIN — Medication 1 TABLET(S): at 10:18

## 2022-05-04 RX ADMIN — AMPICILLIN SODIUM AND SULBACTAM SODIUM 200 GRAM(S): 250; 125 INJECTION, POWDER, FOR SUSPENSION INTRAMUSCULAR; INTRAVENOUS at 06:10

## 2022-05-04 NOTE — ED CDU PROVIDER SUBSEQUENT DAY NOTE - HISTORY
No events. No pertinent interval history. Vital signs remained stable overnight. Received no calls by RN overnight.

## 2022-05-04 NOTE — ED CDU PROVIDER DISPOSITION NOTE - CARE PROVIDER_API CALL
Wander Toussaint)  Infectious Disease; Internal Medicine  [address] [place] [address]  Phone: [phone]  Fax: [phone]  Follow Up Time:

## 2022-05-04 NOTE — CONSULT NOTE ADULT - SUBJECTIVE AND OBJECTIVE BOX
ORTHO-HAND SERVICE    Pt Name: SHEFALI CANTOR    MRN: [id number]      Patient is a 62y Female presenting to the emergency department with a chief complaint of left hand pain/redness/swelling s/p cat scratch sustained at 2am on monday 5/2. Pt was seen in the ED on 5/2 and discharged with a Zpak. Pt returns today for worsening infection despite outpatient PO abx therapy. Pt otherwise denies fever/chills, c/p, sob, abd pain, n/v/c/d, dysuria, numbness/tingling/weakness and has no other complaints at this time.       Patient presents for evaluation of left lower arm/hand infection s/p cat scratch.      REVIEW OF SYSTEMS    General: Alert, responsive, in NAD    Skin/Breast: + abrasions, + cellulitis  	  Ophthalmologic: No visual changes. No redness.   	  ENMT:	No discharge. No swelling.    Respiratory and Thorax: No difficulty breathing. No cough.  	   Cardiovascular: No chest pain. No palpitations.    Gastrointestinal: No abdominal pain. No diarrhea.     Genitourinary: No dysuria. No bleeding.    Musculoskeletal: SEE HPI.    Neurological: No sensory or motor changes.     Psychiatric: No anxiety or depression.    Hematology/Lymphatics: No swelling.    Endocrine: No Hx of diabetes.    ROS is otherwise negative.    PAST MEDICAL & SURGICAL HISTORY:  PAST MEDICAL & SURGICAL HISTORY:  Hyperlipidemia    H/O carpal tunnel repair    S/P hernia repair        Allergies: Allergy Status Unknown  doxycycline (Other)      Medications: ampicillin/sulbactam  IVPB      lactobacillus acidophilus 1 Tablet(s) Oral daily      FAMILY HISTORY:  FH: myocardial infarction (Father, Sibling)    FH: diabetes mellitus (Father, Sibling)    : non-contributory    Social History: denies ETOH abuse.      Daily Height in cm: 162.56 (03 May 2022 17:49)    Daily                             9.4    6.93  )-----------( 251      ( 03 May 2022 18:40 )             32.0       05-03    137  |  102  |  17.2  ----------------------------<  118<H>  3.6   |  22.0  |  0.72    Ca    9.4      03 May 2022 18:40    TPro  7.0  /  Alb  4.0  /  TBili  0.4  /  DBili  x   /  AST  20  /  ALT  19  /  AlkPhos  121<H>  05-03        PHYSICAL EXAM:      Appearance: Alert, responsive, in no acute distress.    Left upper extremity: Sensation is grossly intact to light touch. + 2 abrasions of the left dorsal hand with no active bleeding/discharge and no palpable collection/fluctuance noted. + surrounding erythema/cellulitis extending from the dorsal hand to the mid proximal forearm. 2+ distal pulses. Cap refill < 2 sec. No signs of venous  insufficiency  or stasis. No extremity ulcerations. No cyanosis. Compartments soft and compressible. +ROM intact of the wrist/fingers with limitation secondary to reported pain/swelling.    Imaging Studies: All imaging reviewed.    < from: Xray Hand 3 Views, Left (05.02.22 @ 09:44) >    ACC: [id number] EXAM:  XR HAND MIN 3 VIEWS LT                          PROCEDURE DATE:  05/02/2022          INTERPRETATION:  Clinical history: 62-year-old female, pain.    Three views of the left hand without comparison demonstrate mild   degenerative change with no fracture, dislocation or radiographic soft   tissue abnormality..    No erosions or abnormal calcifications    IMPRESSION:  No acute radiographic findings    --- End of Report ---            DONTA VAUGHN DO; Attending Radiologist  This document has been electronically signed. May  2 2022  3:31PM    < end of copied text >      A/P:  Pt is a  62y Female with left hand/distal forearm cellulitis s/p cat scratch x 2 days ago.    PLAN d/w Dr. Douglas:   * Continue IV abx  * Recommend ID consult  * Splint applied for comfort  * Recommend elevation and warm compressed   * Ortho to follow
                                           [facility]                                                INFECTIOUS DISEASES  =======================================================                               Lance Collier MD#  Wander Toussaint MD*                                     Eric Kimbrough MD*    Kareen Watson MD*            Diplomates American Board of Internal Medicine & Infectious Diseases                  # [place] Office - Appt - Tel  [phone] Fax [phone]                * [place] Office - Appt - Tel [phone] Fax [phone]                                  Hospital Consult line:  [phone]  =======================================================      [id number]  SHEFALI CANTOR   HPI: This 62 y.o. F with hyperlipidemia, who first came to the ER on 5/2/22 for pain in the left hand after her kitten scratched her hand.    She was given a course of Azithromycin, which she took for 2 days w/o improvement.   The hand continue to get more swollen and tender.     reports that this is a new kitten, lives indoors.     Xray of the hand does not show any abnormalities.  No fevers detected.   labs show normal WBC.    I have personally reviewed the labs and data; pertinent labs and data are listed in this note; please see below.   =======================================================  Past Medical & Surgical Hx:  =====================  PAST MEDICAL & SURGICAL HISTORY:  Hyperlipidemia    H/O carpal tunnel repair    S/P hernia repair      Problem List:  ==========  HEALTH ISSUES - PROBLEM Dx:        Social Hx:  =======  no toxic habits currently    FAMILY HISTORY:  FH: myocardial infarction (Father, Sibling)    FH: diabetes mellitus (Father, Sibling)    no significant family history of immunosuppressive disorders in mother or father   =======================================================    REVIEW OF SYSTEMS:  CONSTITUTIONAL:  No Fever or chills  HEENT:  No diplopia or blurred vision.  No earache, sore throat or runny nose.  CARDIOVASCULAR:  No pressure, squeezing, strangling, tightness, heaviness or aching about the chest, neck, axilla or epigastrium.  RESPIRATORY:  No cough, shortness of breath  GASTROINTESTINAL:  No nausea, vomiting or diarrhea.  GENITOURINARY:  No dysuria, frequency or urgency. No Blood in urine  MUSCULOSKELETAL:  no joint aches, no muscle pain  SKIN:   LEFT HAND swelling and pain  NEUROLOGIC:  No Headaches, seizures or weakness.  PSYCHIATRIC:  No disorder of thought or mood.  ENDOCRINE:  No heat or cold intolerance  HEMATOLOGICAL:  No easy bruising or bleeding.    =======================================================  Allergies  Allergy Status Unknown  doxycycline (Other)    Antibiotics:  amoxicillin  875 milliGRAM(s)/clavulanate 1 Tablet(s) Oral once    Other medications:  lactobacillus acidophilus 1 Tablet(s) Oral daily     ampicillin/sulbactam  IVPB   200 mL/Hr IV Intermittent (05-03-22 @ 20:10)    ampicillin/sulbactam  IVPB   200 mL/Hr IV Intermittent (05-03-22 @ 23:31)   200 mL/Hr IV Intermittent (05-04-22 @ 06:10)      ======================================================  Physical Exam:  ============  T(F): 98.7 (04 May 2022 07:27), Max: 99.5 (03 May 2022 17:49)  HR: 70 (04 May 2022 07:27)  BP: 163/78 (04 May 2022 07:27)  RR: 17 (04 May 2022 07:27)  SpO2: 95% (04 May 2022 07:27) (95% - 97%)  temp max in last 48H T(F): , Max: 99.5 (05-03-22 @ 17:49)Height (cm): 162.6 (05-03-22 @ 17:49)  Weight (kg): 60.8 (05-03-22 @ 17:49)  BMI (kg/m2): 23 (05-03-22 @ 17:49)  BSA (m2): 1.65 (05-03-22 @ 17:49)    General:  No acute distress.  Eye: Pupils are equal, round and reactive to light, Extraocular movements are intact, Normal conjunctiva.  HENT: Normocephalic, Oral mucosa is moist, No pharyngeal erythema, No sinus tenderness.  Neck: Supple, No lymphadenopathy.  Respiratory: Lungs are clear to auscultation, Respirations are non-labored.  Cardiovascular: Normal rate, Regular rhythm,   Gastrointestinal: Soft, Non-tender, Non-distended, Normal bowel sounds.  Genitourinary: No costovertebral angle tenderness.  Lymphatics: No lymphadenopathy neck,   Musculoskeletal: Normal range of motion, Normal strength.  Integumentary: LEFT hand dorsum with some erythema, edema present.  + puncture hernando noted daly dorsum of hand at 3rd and 5th metacarpals.   Neurologic: Alert, Oriented, No focal deficits, Cranial Nerves II-XII are grossly intact.  Psychiatric: Appropriate mood & affect.    =======================================================  Labs:                        9.1    6.01  )-----------( 224      ( 04 May 2022 05:52 )             31.2     05-03    137  |  102  |  17.2  ----------------------------<  118<H>  3.6   |  22.0  |  0.72    Ca    9.4      03 May 2022 18:40    TPro  7.0  /  Alb  4.0  /  TBili  0.4  /  DBili  x   /  AST  20  /  ALT  19  /  AlkPhos  121<H>  05-03      Creatinine, Serum: 0.72 mg/dL (05-03-22 @ 18:40)    C-Reactive Protein, Serum: 60 mg/L (05-03-22 @ 18:40)    Sedimentation Rate, Erythrocyte: 23 mm/hr (05-03-22 @ 18:40)       < from: Xray Hand 3 Views, Left (05.02.22 @ 09:44) >    ACC: [id number] EXAM:  XR HAND MIN 3 VIEWS LT                          PROCEDURE DATE:  05/02/2022          INTERPRETATION:  Clinical history: 62-year-old female, pain.    Three views of the left hand without comparison demonstrate mild   degenerative change with no fracture, dislocation or radiographic soft   tissue abnormality..    No erosions or abnormal calcifications    IMPRESSION:  No acute radiographic findings    --- End of Report ---         HERNANDO VAUGHN DO; Attending Radiologist  This document has been electronically signed. May  2 2022  3:31PM    < end of copied text >

## 2022-05-04 NOTE — PROGRESS NOTE ADULT - SUBJECTIVE AND OBJECTIVE BOX
Pt Name: SHEFALI CANTOR    MRN: [id number]    Patient is a being followed for left hand cat scratch, with cellulitis. Patient notes significant improvement to her pain, swelling in her hand, and erythema on hand and forearm. Pt reports she has significant improvement in her ability to move her fingers, still unable to make fist. Pt denies fever/chills. Patient wishes to go home today. Denies numbness or tingling.     PAST MEDICAL & SURGICAL HISTORY:  PAST MEDICAL & SURGICAL HISTORY:  Hyperlipidemia    H/O carpal tunnel repair    S/P hernia repair    Allergies: Allergy Status Unknown  doxycycline (Other)    Medications: lactobacillus acidophilus 1 Tablet(s) Oral daily               9.1    6.01  )-----------( 224      ( 04 May 2022 05:52 )             31.2     05-03    137  |  102  |  17.2  ----------------------------<  118<H>  3.6   |  22.0  |  0.72    Ca    9.4      03 May 2022 18:40    TPro  7.0  /  Alb  4.0  /  TBili  0.4  /  DBili  x   /  AST  20  /  ALT  19  /  AlkPhos  121<H>  05-03      PHYSICAL EXAM:    Vital Signs Last 24 Hrs  T(C): 37.1 (04 May 2022 07:27), Max: 37.5 (03 May 2022 17:49)  T(F): 98.7 (04 May 2022 07:27), Max: 99.5 (03 May 2022 17:49)  HR: 70 (04 May 2022 07:27) (69 - 101)  BP: 163/78 (04 May 2022 07:27) (138/85 - 174/90)  BP(mean): --  RR: 17 (04 May 2022 07:27) (16 - 18)  SpO2: 95% (04 May 2022 07:27) (95% - 97%)  Daily Height in cm: 162.56 (03 May 2022 17:49)    Daily     Appearance: Alert, responsive, in no acute distress.  LUE: left hand cat scratch marks noted x 2 to dorsal hand and ulnar aspect of hand, no active bleeding or drainage. +erythema noted to dorsum of hand extending to dorsal aspect of mid forearm. +TTP over puncture sites, no fluctance or purulent drainage noted. +ROM digits intact, +ROM wrist intact, SILT, RP intact, BCR, no cyanosis    A/P:  Pt is a  62y Female with left hand cat scratch, with cellulitis    PLAN:   * ID following  * Pain Control  * Elevate extremity to reduce swelling  * Splint for comfort

## 2022-05-04 NOTE — ED ADULT NURSE REASSESSMENT NOTE - NS ED NURSE REASSESS COMMENT FT1
Assumed care of patient at this time. Patient placed in observation for IV antibiotic treatment. Patient AxOx4. Patient noted to have redness and swelling to the left hand, patient denies pain at this time. Full movement of all fingers noted. IV antibiotics infusing.
Patient rested in bed throughout the night with no acute incidents or complaints. IV antibiotics administered as per orders. Safety maintained. Call bell usage reinforced and placed within reach.
assumed pt. care from Marie MOLINA.
report given to Meli MOLINA.  Pt. going to A6L
Assumed care of the patient @0730. Pt A&Ox4, VSS afebrile. Left hand with some redness and swelling. Pt stating it has improved. Awaiting ID consult. Patient in understanding of plan of care. Patient with no further questions for the RN. Resting in comfort. Call bell within reach and encouraged to use when assistance needed. Will continue to monitor.

## 2022-05-04 NOTE — ED CDU PROVIDER SUBSEQUENT DAY NOTE - ATTENDING APP SHARED VISIT CONTRIBUTION OF CARE
pt with cellulitis to hand after her cat scratched it  pt notes marked improvement-decreased redness and swelling  will continue current regimen and f/u on ID consult today

## 2022-05-04 NOTE — ED CDU PROVIDER SUBSEQUENT DAY NOTE - MEDICAL DECISION MAKING DETAILS
63 yo female PMHx HLD presents to ED with hand/wrist cellulitis following cat bite/scratch placed in CDU for cellulitis. Completed two doses of Azithromycin without improvement. Afebrile, no leukocytosis. Receiving Unasyn. Hand following. Pending continued IV management, ID. Further management plan pending clinical course consult recommendations.

## 2022-05-04 NOTE — ED CDU PROVIDER DISPOSITION NOTE - CLINICAL COURSE
Pt is a 62y F with PMH of HLD presents to ED c/o hand pain/swelling x2 days. Patient reports woke up from sleep from sharp pain to hand. Unsure if cat either bit or scratched. Presented to ED yesterday, prescribed Zpack and Tetanus updated. Presented today for worsening swelling/redness. Right hand dominant. Pt placed in obs for hand cosult/IV abx/ID consult. Pt given few doses of unasyn and improving. ID recommends augmentin for home. Will dc.   Denies fever.

## 2022-05-04 NOTE — CONSULT NOTE ADULT - ASSESSMENT
This 62 y.o. F with hyperlipidemia, who first came to the ER on 5/2/22 for pain in the left hand after her kitten scratched her hand.    She was given a course of Azithromycin, which she took for 2 days w/o improvement.   The hand continue to get more swollen and tender.     reports that this is a new kitten, lives indoors.     Xray of the hand does not show any abnormalities.  No fevers detected.   labs show normal WBC.        Impression:  left hand pain  cat scratch      Plan:  - patient given azithromycin, which would treat only Bartonella, associated with "cat-scratch disease"  however, scratches from Cats can introduce skin patricio - Staph, and strep, AND also oral patricio from Cats such as Pasteurella.      Cats apparently lick their paws, which can contaminate them with oral patricio such as Pasteurella.     No Lymphadenopathy on exam.  Can stop the Azithromycin.     Start Augmentin 875mg PO BID x 10 days.       Consider discharge to community from ID point of view once antibiotics have been arranged.    patient can follow with me in 2 weeks:  Office Contact Information:    =======================================================                               Lance Collier MD#  Wander Toussaint MD*                                     Eric Kimbrough MD*    Kareen Watson MD*            Diplomates American Board of Internal Medicine & Infectious Diseases                  # [place] Office - Appt - Tel  [phone] Fax [phone]                * [place] Office - Appt - Tel [phone] Fax [phone]                                  Hospital Consult line:  [phone]  =======================================================  
None

## 2022-05-04 NOTE — ED CDU PROVIDER DISPOSITION NOTE - NSFOLLOWUPINSTRUCTIONS_ED_ALL_ED_FT
Follow up with infectious disease.  Take antibiotics as prescribed.  Elevate affected extremity and apply warm compresses.  Tylenol/motrin for pain.  Come back with new or worsening symptoms.   Cellulitis    Cellulitis is a skin infection caused by bacteria. This condition occurs most often in the arms and lower legs but can occur anywhere over the body. Symptoms include redness, swelling, warm skin, tenderness, and chills/fever. If you were prescribed an antibiotic medicine, take it as told by your health care provider. Do not stop taking the antibiotic even if you start to feel better.    SEEK IMMEDIATE MEDICAL CARE IF YOU HAVE ANY OF THE FOLLOWING SYMPTOMS: worsening fever, red streaks coming from affected area, vomiting or diarrhea, or dizziness/lightheadedness.

## 2022-05-04 NOTE — ED CDU PROVIDER SUBSEQUENT DAY NOTE - PHYSICAL EXAMINATION
Gen: Nontoxic, well appearing, in NAD.  Skin: Warm and dry as visualized. Left hand will swelling and erythema. Erythema extends to mid forearm. Noncircumferential. Two scabbed abrasion to hand. No crepitus or drainable collection.   Head: NC/AT.  Eyes: PERRLA. EOMI.  Neck: Supple, FROM. No LAD.   Resp: No distress.  Cardio: Well perfused.  Ext: No deformities. MAEx4. Left hand able to oppose 1st and 2nd digit, limited ROM digits 3-5.   Neuro: A&Ox3. Sensation intact.   Psych: Normal affect and mood.

## 2022-05-04 NOTE — ED CDU PROVIDER DISPOSITION NOTE - PATIENT PORTAL LINK FT
You can access the FollowMyHealth Patient Portal offered by [facility] by registering at the following website: [email or web address]. By joining [facility]’s FollowMyHealth portal, you will also be able to view your health information using other applications (apps) compatible with our system.

## 2022-05-19 ENCOUNTER — APPOINTMENT (OUTPATIENT)
Dept: INTERNAL MEDICINE | Facility: CLINIC | Age: 62
End: 2022-05-19
Payer: COMMERCIAL

## 2022-05-19 ENCOUNTER — LABORATORY RESULT (OUTPATIENT)
Age: 62
End: 2022-05-19

## 2022-05-19 VITALS
SYSTOLIC BLOOD PRESSURE: 177 MMHG | BODY MASS INDEX: 24.07 KG/M2 | DIASTOLIC BLOOD PRESSURE: 84 MMHG | TEMPERATURE: 98.5 F | HEART RATE: 88 BPM | RESPIRATION RATE: 16 BRPM | WEIGHT: 141 LBS | OXYGEN SATURATION: 96 % | HEIGHT: 64 IN

## 2022-05-19 PROCEDURE — 36415 COLL VENOUS BLD VENIPUNCTURE: CPT

## 2022-05-19 PROCEDURE — 99213 OFFICE O/P EST LOW 20 MIN: CPT | Mod: 25

## 2022-05-19 NOTE — PHYSICAL EXAM
[General Appearance - Alert] : alert [General Appearance - In No Acute Distress] : in no acute distress [Sclera] : the sclera and conjunctiva were normal [PERRL With Normal Accommodation] : pupils were equal in size, round, reactive to light [Extraocular Movements] : extraocular movements were intact [Outer Ear] : the ears and nose were normal in appearance [Oropharynx] : the oropharynx was normal with no thrush [Neck Appearance] : the appearance of the neck was normal [Neck Cervical Mass (___cm)] : no neck mass was observed [Jugular Venous Distention Increased] : there was no jugular-venous distention [Thyroid Diffuse Enlargement] : the thyroid was not enlarged [Auscultation Breath Sounds / Voice Sounds] : lungs were clear to auscultation bilaterally [Heart Rate And Rhythm] : heart rate was normal and rhythm regular [Heart Sounds] : normal S1 and S2 [Heart Sounds Gallop] : no gallops [Murmurs] : no murmurs [Heart Sounds Pericardial Friction Rub] : no pericardial rub [Full Pulse] : the pedal pulses are present [Edema] : there was no peripheral edema [Bowel Sounds] : normal bowel sounds [Abdomen Soft] : soft [Abdomen Tenderness] : non-tender [Abdomen Mass (___ Cm)] : no abdominal mass palpated [Costovertebral Angle Tenderness] : no CVA tenderness [No Palpable Adenopathy] : no palpable adenopathy [Musculoskeletal - Swelling] : no joint swelling [Nail Clubbing] : no clubbing  or cyanosis of the fingernails [Motor Tone] : muscle strength and tone were normal [Skin Color & Pigmentation] : normal skin color and pigmentation [] : no rash [FreeTextEntry1] : Slight hyperpigmentation at the dorsum of the left hand [Cranial Nerves] : cranial nerves 2-12 were intact [Sensation] : the sensory exam was normal to light touch and pinprick [No Focal Deficits] : no focal deficits [Oriented To Time, Place, And Person] : oriented to person, place, and time [Affect] : the affect was normal

## 2022-05-19 NOTE — HISTORY OF PRESENT ILLNESS
[FreeTextEntry1] : This 62 y.o. F with hyperlipidemia, who first came to the ER on 5/2/22 for\par pain in the left hand after her kitten scratched her hand.\par She was given a course of Azithromycin, which she took for 2 days w/o\par improvement. The hand continue to get more swollen and tender.\par \par reports that this is a new kitten, lives indoors.\par \par Xray of the hand does not show any abnormalities. No fevers detected.\par labs show normal WBC.\par \par Are May 4, 2022.  Where she was seen by infectious disease.  At the time she was given a course of IV ampicillin sulbactam. \par

## 2022-05-19 NOTE — ASSESSMENT
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  \par A 10-day course was recommended by myself at the time of consultation, but the patient is uncertain if she had taken a 5-day course or 10-day course.\par \par \par Currently she feels generally well.  She still has some tenderness along the area where the cat had scratched on the left dorsum of the hand.  There is no significant erythema.\par \par Defer additional courses of antibiotics.\par Check a CBC, CMP, ESR, CRP today.

## 2022-05-19 NOTE — DATA REVIEWED
[FreeTextEntry1] : \par =======================================================\par Labs:\par \par  9.1\par 6.01 )-----------( 224 ( 04 May 2022 05:52 )\par  31.2\par \par 05-03\par \par 137 | 102 | 17.2\par ----------------------------< 118<H>\par 3.6 | 22.0 | 0.72\par \par Ca 9.4 03 May 2022 18:40\par \par TPro 7.0 / Alb 4.0 / TBili 0.4 / DBili x / AST 20 / ALT 19 /\par AlkPhos 121<H> 05-03\par \par \par Creatinine, Serum: 0.72 mg/dL (05-03-22 @ 18:40)\par \par C-Reactive Protein, Serum: 60 mg/L (05-03-22 @ 18:40)\par \par Sedimentation Rate, Erythrocyte: 23 mm/hr (05-03-22 @ 18:40)\par \par \par < from: Xray Hand 3 Views, Left (05.02.22 @ 09:44) >\par \par ACC: [id number] EXAM: XR HAND MIN 3 VIEWS LT\par \par PROCEDURE DATE: 05/02/2022\par \par \par \par INTERPRETATION: Clinical history: 62-year-old female, pain.\par \par Three views of the left hand without comparison demonstrate mild\par degenerative change with no fracture, dislocation or radiographic soft\par tissue abnormality..\par \par No erosions or abnormal calcifications\par \par IMPRESSION:\par No acute radiographic findings\par \par --- End of Report ---\par \par \par \par DONTA VAUGHN DO; Attending Radiologist\par This document has been electronically signed. May 2 2022 3:31PM\par \par < end of copied text >\par

## 2022-05-24 LAB
ALBUMIN SERPL ELPH-MCNC: 4.4 G/DL
ALP BLD-CCNC: 117 U/L
ALT SERPL-CCNC: 28 U/L
ANION GAP SERPL CALC-SCNC: 12 MMOL/L
AST SERPL-CCNC: 29 U/L
BASOPHILS # BLD AUTO: 0.05 K/UL
BASOPHILS NFR BLD AUTO: 0.9 %
BILIRUB SERPL-MCNC: 0.2 MG/DL
BUN SERPL-MCNC: 22 MG/DL
CALCIUM SERPL-MCNC: 10.1 MG/DL
CHLORIDE SERPL-SCNC: 105 MMOL/L
CO2 SERPL-SCNC: 25 MMOL/L
CREAT SERPL-MCNC: 0.86 MG/DL
CRP SERPL-MCNC: <3 MG/L
EGFR: 75 ML/MIN/1.73M2
EOSINOPHIL # BLD AUTO: 0.17 K/UL
EOSINOPHIL NFR BLD AUTO: 2.9 %
ERYTHROCYTE [SEDIMENTATION RATE] IN BLOOD BY WESTERGREN METHOD: 31 MM/HR
GLUCOSE SERPL-MCNC: 94 MG/DL
HCT VFR BLD CALC: 34.8 %
HGB BLD-MCNC: 9.7 G/DL
IMM GRANULOCYTES NFR BLD AUTO: 0.5 %
LYMPHOCYTES # BLD AUTO: 1.11 K/UL
LYMPHOCYTES NFR BLD AUTO: 19 %
MAN DIFF?: NORMAL
MCHC RBC-ENTMCNC: 21 PG
MCHC RBC-ENTMCNC: 27.9 GM/DL
MCV RBC AUTO: 75.5 FL
MONOCYTES # BLD AUTO: 0.56 K/UL
MONOCYTES NFR BLD AUTO: 9.6 %
NEUTROPHILS # BLD AUTO: 3.93 K/UL
NEUTROPHILS NFR BLD AUTO: 67.1 %
PLATELET # BLD AUTO: 376 K/UL
POTASSIUM SERPL-SCNC: 5.1 MMOL/L
PROT SERPL-MCNC: 6.9 G/DL
RBC # BLD: 4.61 M/UL
RBC # FLD: 18.1 %
SODIUM SERPL-SCNC: 142 MMOL/L
WBC # FLD AUTO: 5.85 K/UL

## 2022-06-01 ENCOUNTER — APPOINTMENT (OUTPATIENT)
Dept: INTERNAL MEDICINE | Facility: CLINIC | Age: 62
End: 2022-06-01
Payer: COMMERCIAL

## 2022-06-01 VITALS
HEIGHT: 64 IN | OXYGEN SATURATION: 97 % | HEART RATE: 99 BPM | BODY MASS INDEX: 24.24 KG/M2 | TEMPERATURE: 96.8 F | SYSTOLIC BLOOD PRESSURE: 130 MMHG | WEIGHT: 142 LBS | DIASTOLIC BLOOD PRESSURE: 80 MMHG

## 2022-06-01 DIAGNOSIS — Z12.9 ENCOUNTER FOR SCREENING FOR MALIGNANT NEOPLASM, SITE UNSPECIFIED: ICD-10-CM
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
## 2022-06-01 DIAGNOSIS — J44.1 CHRONIC OBSTRUCTIVE PULMONARY DISEASE WITH (ACUTE) EXACERBATION: ICD-10-CM

## 2022-06-01 DIAGNOSIS — R74.8 ABNORMAL LEVELS OF OTHER SERUM ENZYMES: ICD-10-CM

## 2022-06-01 DIAGNOSIS — K29.70 GASTRITIS, UNSPECIFIED, W/OUT BLEEDING: ICD-10-CM

## 2022-06-01 DIAGNOSIS — D12.6 BENIGN NEOPLASM OF COLON, UNSPECIFIED: ICD-10-CM

## 2022-06-01 DIAGNOSIS — R82.90 UNSPECIFIED ABNORMAL FINDINGS IN URINE: ICD-10-CM

## 2022-06-01 DIAGNOSIS — Z01.818 ENCOUNTER FOR OTHER PREPROCEDURAL EXAMINATION: ICD-10-CM

## 2022-06-01 DIAGNOSIS — Z12.39 ENCOUNTER FOR OTHER SCREENING FOR MALIGNANT NEOPLASM OF BREAST: ICD-10-CM

## 2022-06-01 PROCEDURE — 99214 OFFICE O/P EST MOD 30 MIN: CPT | Mod: 25

## 2022-06-01 PROCEDURE — 36415 COLL VENOUS BLD VENIPUNCTURE: CPT

## 2022-06-01 NOTE — HISTORY OF PRESENT ILLNESS
[FreeTextEntry8] : -PMH: HLD, COPD, Strong FH of Heart Dz, Osteopenia, Erosive Gastritis (EGD 7/2021), Adenomatous Colon Polyps (7/2021)\par -SH: . Employed. Former smoker (Quit 2010). Occasional EtOH use\par \par SHEFALI is a 62 year F whom is here today for f/u after being seen by ID on May 19th for management of a hand infection whom was noted to have worsening microcytic Anemia \par (5/19/22) CBC: Hb 9.7, MCV 75.5\par Of note, pt w/ h/o anemia. Pt last seen by me 1 yr ago and anemia noted at that time. Was therefore referred to GI whom she consulted with in May & July of 2021. She underwent a colonoscopy & EGD July 2021, Colonoscopy biopsies revealed adenomatous polyps, and repeat colonoscopy in five years for surveillance was advised. EGD demonstrated erosive gastritis but w/o signs of active bleeding, path negative for barrets or h.pylori\par \par Today, pt reports that she remains on the pantoprazole 40mg QD. \par Denies any heart burn, denies melena, hematochezia, palpitations, increased fatigue, dizziness, \par She has an appointment w/ Dr. Osorio in July.

## 2022-06-01 NOTE — ASSESSMENT
[FreeTextEntry1] : Persistent Anemia\par Now w/ Microcytosis\par Remains on ASA 81mg QD given CAD & FH Heart DZ\par PE & VS WNL\par C/w ASA for now\par C/w PPI\par Check CBC & Iron studies drawn in office today\par Further recs pending results\par Will determine if sooner GI consult needed based on labs\par \par

## 2022-06-02 LAB
BASOPHILS # BLD AUTO: 0.04 K/UL
BASOPHILS NFR BLD AUTO: 0.7 %
EOSINOPHIL # BLD AUTO: 0.16 K/UL
EOSINOPHIL NFR BLD AUTO: 2.8 %
FERRITIN SERPL-MCNC: 10 NG/ML
HCT VFR BLD CALC: 35 %
HGB BLD-MCNC: 10 G/DL
IMM GRANULOCYTES NFR BLD AUTO: 0.2 %
IRON SATN MFR SERPL: 3 %
IRON SERPL-MCNC: 17 UG/DL
LYMPHOCYTES # BLD AUTO: 1.02 K/UL
LYMPHOCYTES NFR BLD AUTO: 17.8 %
MAN DIFF?: NORMAL
MCHC RBC-ENTMCNC: 21.8 PG
MCHC RBC-ENTMCNC: 28.6 GM/DL
MCV RBC AUTO: 76.4 FL
MONOCYTES # BLD AUTO: 0.49 K/UL
MONOCYTES NFR BLD AUTO: 8.6 %
NEUTROPHILS # BLD AUTO: 4 K/UL
NEUTROPHILS NFR BLD AUTO: 69.9 %
PLATELET # BLD AUTO: 285 K/UL
RBC # BLD: 4.58 M/UL
RBC # FLD: 19.8 %
TIBC SERPL-MCNC: 505 UG/DL
UIBC SERPL-MCNC: 489 UG/DL
WBC # FLD AUTO: 5.72 K/UL

## 2022-07-13 ENCOUNTER — APPOINTMENT (OUTPATIENT)
Dept: GASTROENTEROLOGY | Facility: CLINIC | Age: 62
End: 2022-07-13

## 2022-07-13 VITALS
WEIGHT: 136 LBS | HEART RATE: 96 BPM | SYSTOLIC BLOOD PRESSURE: 110 MMHG | BODY MASS INDEX: 23.22 KG/M2 | HEIGHT: 64 IN | TEMPERATURE: 97.5 F | OXYGEN SATURATION: 98 % | RESPIRATION RATE: 14 BRPM | DIASTOLIC BLOOD PRESSURE: 80 MMHG

## 2022-07-13 DIAGNOSIS — K58.9 IRRITABLE BOWEL SYNDROME W/OUT DIARRHEA: ICD-10-CM

## 2022-07-13 PROCEDURE — 99214 OFFICE O/P EST MOD 30 MIN: CPT

## 2022-07-13 NOTE — HISTORY OF PRESENT ILLNESS
[de-identified] : SHEFALI CANTOR is a 62 year year old female with a PMH significant for COPD, HLD, anemia, CAD, and colon polyps. Since last visit pt states she had work up done by hematology to r/o competing etiologies of iron deficiency anemia and was recommended to start iron supplement which she did in the last month. Pt states she continues to take the Pantoprazole 40 mg daily and has no symptoms of GERD. Pt is on aspirin for strong family history of heart disease. Last egd done 7/2021 revealed erosive esophagitis and gastritis, negative for celiac. Pt reports that she continues to have frequents bowel movements, about 6 per day, including night time, that are sometimes solid or loose. States this has been going on for many years. Pt has tried eliminating possible trigger foods in her diet, such as lactose, with no change in symptoms. Pt had these symptoms before starting the Pantoprazole. Last colonoscopy was in July 2021, which revealed sessile serrated polyp and tubular adenoma.  Denies abdominal pain, change in appetite, constipation, rectal bleeding, melena, hematemesis, unintentional weight loss or gain, or dysphagia.

## 2022-07-13 NOTE — ASSESSMENT
[FreeTextEntry1] : SHEFALI CANTOR is a 62 year year old female with a PMH significant for COPD, HLD, anemia, CAD, and colon polyps.\par \par Recommend capsule endoscopy study to complete work up of anemia.\par Hold iron supplement 1 week prior to study and then restart.\par Continue Pantoprazole 40 mg daily.\par Recommend lactose tolerance test.\par Continue GERD diet\par Follow up after capsule endoscopy\par \par I, Dr. Flores Osorio, was present for history and physical.  I agree with the above assessment and plan.

## 2022-07-13 NOTE — PHYSICAL EXAM
[General Appearance - Alert] : alert [General Appearance - In No Acute Distress] : in no acute distress [Sclera] : the sclera and conjunctiva were normal [Neck Appearance] : the appearance of the neck was normal [Heart Rate And Rhythm] : heart rate was normal and rhythm regular [Murmurs] : no murmurs [Edema] : there was no peripheral edema [Bowel Sounds] : normal bowel sounds [Abdomen Soft] : soft [Abdomen Tenderness] : non-tender [Abdomen Mass (___ Cm)] : no abdominal mass palpated [Abnormal Walk] : normal gait [Nail Clubbing] : no clubbing  or cyanosis of the fingernails [Musculoskeletal - Swelling] : no joint swelling seen [Motor Tone] : muscle strength and tone were normal [Skin Color & Pigmentation] : normal skin color and pigmentation [Skin Turgor] : normal skin turgor [] : no rash [Oriented To Time, Place, And Person] : oriented to person, place, and time [Impaired Insight] : insight and judgment were intact [Affect] : the affect was normal

## 2022-07-13 NOTE — ADDENDUM
[FreeTextEntry1] : I, Quiana Winchester NP, acted as scribe for CHUYITA Adame for this patient encounter.

## 2022-08-09 ENCOUNTER — APPOINTMENT (OUTPATIENT)
Dept: INTERNAL MEDICINE | Facility: CLINIC | Age: 62
End: 2022-08-09

## 2022-08-09 VITALS
BODY MASS INDEX: 23.73 KG/M2 | OXYGEN SATURATION: 98 % | HEART RATE: 90 BPM | DIASTOLIC BLOOD PRESSURE: 80 MMHG | WEIGHT: 139 LBS | TEMPERATURE: 95.5 F | SYSTOLIC BLOOD PRESSURE: 140 MMHG | HEIGHT: 64 IN

## 2022-08-09 DIAGNOSIS — R01.1 CARDIAC MURMUR, UNSPECIFIED: ICD-10-CM

## 2022-08-09 DIAGNOSIS — S60.512A ABRASION OF LEFT HAND, INITIAL ENCOUNTER: ICD-10-CM

## 2022-08-09 DIAGNOSIS — R58 HEMORRHAGE, NOT ELSEWHERE CLASSIFIED: ICD-10-CM
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
## 2022-08-09 DIAGNOSIS — Z23 ENCOUNTER FOR IMMUNIZATION: ICD-10-CM

## 2022-08-09 DIAGNOSIS — Z00.00 ENCOUNTER FOR GENERAL ADULT MEDICAL EXAMINATION W/OUT ABNORMAL FINDINGS: ICD-10-CM

## 2022-08-09 DIAGNOSIS — M79.642 PAIN IN LEFT HAND: ICD-10-CM

## 2022-08-09 PROCEDURE — 36415 COLL VENOUS BLD VENIPUNCTURE: CPT

## 2022-08-09 PROCEDURE — 99213 OFFICE O/P EST LOW 20 MIN: CPT | Mod: 25

## 2022-08-09 PROCEDURE — 99396 PREV VISIT EST AGE 40-64: CPT | Mod: 25

## 2022-08-09 RX ORDER — ASPIRIN 81 MG
81 TABLET, DELAYED RELEASE (ENTERIC COATED) ORAL
Refills: 0 | Status: ACTIVE | COMMUNITY

## 2022-08-09 RX ORDER — BETAMETHASONE DIPROPIONATE 0.5 MG/G
0.05 CREAM, AUGMENTED TOPICAL
Qty: 50 | Refills: 0 | Status: DISCONTINUED | COMMUNITY
Start: 2022-06-23

## 2022-08-09 RX ORDER — FLUOCINONIDE 0.5 MG/G
0.05 CREAM TOPICAL
Qty: 60 | Refills: 0 | Status: DISCONTINUED | COMMUNITY
Start: 2022-03-03

## 2022-08-09 RX ORDER — AMOXICILLIN AND CLAVULANATE POTASSIUM 875; 125 MG/1; MG/1
875-125 TABLET, COATED ORAL
Qty: 20 | Refills: 0 | Status: DISCONTINUED | COMMUNITY
Start: 2022-05-04

## 2022-08-09 RX ORDER — DEXAMETHASONE 6 MG/1
6 TABLET ORAL DAILY
Qty: 5 | Refills: 0 | Status: DISCONTINUED | COMMUNITY
Start: 2022-01-24 | End: 2022-08-09

## 2022-08-10 ENCOUNTER — NON-APPOINTMENT (OUTPATIENT)
Age: 62
End: 2022-08-10

## 2022-08-10 LAB
25(OH)D3 SERPL-MCNC: 53 NG/ML
BASOPHILS # BLD AUTO: 0.04 K/UL
BASOPHILS NFR BLD AUTO: 0.6 %
CHOLEST SERPL-MCNC: 354 MG/DL
EOSINOPHIL # BLD AUTO: 0.1 K/UL
EOSINOPHIL NFR BLD AUTO: 1.5 %
ESTIMATED AVERAGE GLUCOSE: 108 MG/DL
FERRITIN SERPL-MCNC: 85 NG/ML
HBA1C MFR BLD HPLC: 5.4 %
HCT VFR BLD CALC: 42.7 %
HDLC SERPL-MCNC: 50 MG/DL
HGB BLD-MCNC: 13.4 G/DL
IMM GRANULOCYTES NFR BLD AUTO: 0.5 %
IRON SATN MFR SERPL: 25 %
IRON SERPL-MCNC: 92 UG/DL
LDLC SERPL CALC-MCNC: NORMAL MG/DL
LYMPHOCYTES # BLD AUTO: 0.99 K/UL
LYMPHOCYTES NFR BLD AUTO: 15.1 %
MAN DIFF?: NORMAL
MCHC RBC-ENTMCNC: 28.3 PG
MCHC RBC-ENTMCNC: 31.4 GM/DL
MCV RBC AUTO: 90.1 FL
MONOCYTES # BLD AUTO: 0.51 K/UL
MONOCYTES NFR BLD AUTO: 7.8 %
NEUTROPHILS # BLD AUTO: 4.89 K/UL
NEUTROPHILS NFR BLD AUTO: 74.5 %
NONHDLC SERPL-MCNC: 305 MG/DL
PLATELET # BLD AUTO: 227 K/UL
RBC # BLD: 4.74 M/UL
RBC # FLD: 20.6 %
TIBC SERPL-MCNC: 373 UG/DL
TRIGL SERPL-MCNC: 868 MG/DL
UIBC SERPL-MCNC: 281 UG/DL
WBC # FLD AUTO: 6.56 K/UL

## 2022-08-10 NOTE — ASSESSMENT
[FreeTextEntry1] : -PMH: HLD, COPD, Strong FH of Heart Dz, h/o Pre-Malignant Colon Polyps (7/2021; Last Colonoscopy), IBS, Iron Def Anemia, h/o Erosive esophagitis & gastritis w/ Hiatal Hernia\par -SH: . Employed. Former smoker (Quit 2010). Occasional EtOH use\par \par SHEFALI is a 62 year F whom is here today for an annual well check\par \par Specialists Involved:\par -OBGYN: Dr. Jameson\par -GI: Dr. Jareth Ram\par -Pulm: Dr. Lance Peña\par -Cardio: Dr. Shayna Pelaez ([phone])\par \par -F/u labs drawn in office today\par -Further recs pending lab results\par -F/u ECHO from Cardio\par -F/u Pap report from Gyn\par -F/u Mammogram from [facility]\par -Continue routine f/u w/ Cardio (HLD, Strong FH)\par -Continue routine f/u w/ Pulm (COPD, Lung CA Screening)\par -Continue f/u w/ GI (Iron Def Anemia w/u)\par -Continue routine f/u w/ Gyn (Cervical CA Screening)\par -RTO 6mo for routine f/u & labs or sooner if needed.

## 2022-08-10 NOTE — HEALTH RISK ASSESSMENT
[Very Good] : ~his/her~  mood as very good [Yes] : Yes [2 - 4 times a month (2 pts)] : 2-4 times a month (2 points) [5 or 6 (2 pts)] : 5 or 6 (2  points) [Weekly (3 pts)] : Weekly (3 points) [No] : In the past 12 months have you used drugs other than those required for medical reasons? No [No falls in past year] : Patient reported no falls in the past year [0] : 2) Feeling down, depressed, or hopeless: Not at all (0) [Patient reported mammogram was normal] : Patient reported mammogram was normal [Patient reported PAP Smear was normal] : Patient reported PAP Smear was normal [Patient reported colonoscopy was abnormal] : Patient reported colonoscopy was abnormal [HIV test declined] : HIV test declined [Hepatitis C test declined] : Hepatitis C test declined [None] : None [With Family] : lives with family [Employed] : employed [] :  [# Of Children ___] : has [unfilled] children [Fully functional (bathing, dressing, toileting, transferring, walking, feeding)] : Fully functional (bathing, dressing, toileting, transferring, walking, feeding) [Fully functional (using the telephone, shopping, preparing meals, housekeeping, doing laundry, using] : Fully functional and needs no help or supervision to perform IADLs (using the telephone, shopping, preparing meals, housekeeping, doing laundry, using transportation, managing medications and managing finances) [2] : 2 [Patient reported bone density results were abnormal] : Patient reported bone density results were abnormal [Reviewed no changes] : Reviewed, no changes [Former] : Former [20 or more] : 20 or more [PHQ-2 Negative - No further assessment needed] : PHQ-2 Negative - No further assessment needed [YearQuit] : 2010 [Audit-CScore] : 7 [LWG1Rptkl] : 0 [LowDoseCTScan] : 02/22 [Change in mental status noted] : No change in mental status noted [Sexually Active] : not sexually active [Reports changes in hearing] : Reports no changes in hearing [Reports changes in vision] : Reports no changes in vision [MammogramDate] : 01/22 [MammogramComments] : per patient. Records from [facility] [PapSmearDate] : 01/22 [PapSmearComments] : per patient. Records from her Gyn requested [BoneDensityDate] : 02/21 [ColonoscopyDate] : 07/21 [AdvancecareDate] : 08/22

## 2022-08-10 NOTE — HISTORY OF PRESENT ILLNESS
[FreeTextEntry1] : Annual well visit [de-identified] : -PMH: HLD, COPD, Strong FH of Heart Dz, h/o Pre-Malignant Colon Polyps (2021; Last Colonoscopy), IBS, Iron Def Anemia, h/o Erosive esophagitis & gastritis w/ Hiatal Hernia\par -SH: . Employed. Former smoker (Quit ). Occasional EtOH use\par \par SHEFALI is a 62 year F whom is here today for an annual well check\par \par Specialists Involved:\par -[facility] [phone]\par -OBGYN: Dr. Jameson  ([phone])\par -GI: Dr. Jareth Ram\par -Pulm: Dr. Lance Peña\par -Cardio: Dr. Shayna Pelaez ([phone])\par \par -Vaccines: Needs PPSV 23, Shingles (Will call [employer] [facility])\par -DEXA: 2021\par -Mammogram: 2022\par -Pap Smear: 2022\par -Colonoscopy: 2021. Repeat 5 yrs. sessile serrated polyp & Tubular adenoma. \par -Lung CT CA Screenin2022\par -FH of Colon, breast or ovarian CA: Maternal Aunt had Breast CA\par \par -Iron Def Anemia: Remains on Ferrous Sulfate 1tab QD. Following w/ GI (Last seen 2022). Per GI, capsule endoscopy recommended to complete anemia w/u. Pt to hold iron supplement 1 week prior to study and then restart. Continue Pantoprazole 40 mg daily. F/u lactose tolerance test. F/u w/ GI in office after capsule endoscopy. \par s/p colonoscopy & EGD 2021. Colonoscopy revealed adenomatous polyps (Repeat colonoscopy five years for surveillance was advised). EGD demonstrated erosive gastritis w/o signs of active bleeding, path negative for barrets or h.pylori. \par \par -Elevated LFTs: No longer on Statin therapy due presumed DILI. She is on Ezetimibe. \par \par -HLD: No longer on Atorvastatin & Vascepa 2/2 h/o DILI. Remains on Ezetimibe 10mg QD. No reported changes. \par \par -COPD: Former smoker (Quit ). Managed on Breo as per Pulm. Follow w/ Pulm yearly for routine f/u and repeat CT Chest for Lung CA Screening. No other reported changes. \par \par -Osteopenia: (2021) DEXA: Osteopenia w/ lowest Tscore of -2.2 left femoral neck. Normal FRAX. Remains on Vit-D. \par \par -She has a h/o SUSI & Drug induced liver Injury 2/2 Doxycycline which is all now resolved

## 2022-08-10 NOTE — ADDENDUM
[FreeTextEntry1] : Hgb has significantly improved. Now 13.4 up from 10. Her Iron studies are now within normal  range. Recommend she continue with 1 more month of ferrous sulfate 325mg 1tab QD THEN STOP\par \par Patient cholesterol & Triglycerides were significantly elevated. Was patient fasting? If she was not fasting, please advise her to return to lab for repeat FASTING Lipid Panel

## 2022-08-11 ENCOUNTER — APPOINTMENT (OUTPATIENT)
Dept: INTERNAL MEDICINE | Facility: CLINIC | Age: 62
End: 2022-08-11

## 2022-08-11 PROCEDURE — 36415 COLL VENOUS BLD VENIPUNCTURE: CPT

## 2022-08-12 ENCOUNTER — NON-APPOINTMENT (OUTPATIENT)
Age: 62
End: 2022-08-12

## 2022-08-12 LAB
CHOLEST SERPL-MCNC: 322 MG/DL
HDLC SERPL-MCNC: 55 MG/DL
LDLC SERPL CALC-MCNC: 199 MG/DL
NONHDLC SERPL-MCNC: 267 MG/DL
TRIGL SERPL-MCNC: 338 MG/DL

## 2022-08-19 ENCOUNTER — APPOINTMENT (OUTPATIENT)
Dept: GASTROENTEROLOGY | Facility: CLINIC | Age: 62
End: 2022-08-19

## 2022-08-19 PROCEDURE — 91110 GI TRC IMG INTRAL ESOPH-ILE: CPT

## 2022-09-01 ENCOUNTER — NON-APPOINTMENT (OUTPATIENT)
Age: 62
End: 2022-09-01

## 2022-09-06 ENCOUNTER — APPOINTMENT (OUTPATIENT)
Dept: PULMONOLOGY | Facility: CLINIC | Age: 62
End: 2022-09-06

## 2022-11-09 NOTE — ED ADULT NURSE NOTE - LATERALITY
[de-identified] : 2/23/2022 atrial tachycardia with block, ventricular rate 48, possible old anterior wall MI. [de-identified] : 5/28/2021 severe LV dysfunction with dilated LV, moderate eccentric MR, normal aortic valve. There is decreased RV systolic function, only mild TR, no pulmonary hypertension.\par 9/29/2022: global mildly decreased LV systolic function, mild mitral valve regurgitation, normal aortic valve, normal right ventricular size and function, mild tricuspid regurgitation. [de-identified] : 80% proximal LAD lesion, stent placed, normal left main, diagonal, circumflex and RCA. Severity of LV dysfunction disproportionate to severity of coronary disease. left

## 2022-12-02 NOTE — PROCEDURE
"Subjective:       Patient ID: She Cunningham is a 38 y.o. male.    Vitals:  height is 6' 2" (1.88 m) and weight is 77.1 kg (170 lb). His oral temperature is 98.6 °F (37 °C). His blood pressure is 110/79 and his pulse is 71. His respiration is 16 and oxygen saturation is 98%.     Chief Complaint: Influenza (Pt stated cough, sore throat, congestion, and body aches x 1 week. + Flu exposure at home )    Patient presents today for evaluation of cough, sore throat, congestion, and body aches x 1 week. Family members were seen earlier today in clinic and tested positive for Influenza A. Patient is concerned the may have the flu, as well.     Influenza  This is a new problem. The current episode started in the past 7 days. The problem occurs constantly. The problem has been gradually worsening. Associated symptoms include chest pain (with coughing), chills, congestion, coughing, fatigue, a fever, headaches, myalgias and a sore throat. The symptoms are aggravated by exertion. He has tried acetaminophen for the symptoms. The treatment provided no relief.     Constitution: Positive for chills, fatigue and fever.   HENT:  Positive for congestion and sore throat.    Neck: neck negative.   Cardiovascular:  Positive for chest pain (with coughing).   Respiratory:  Positive for cough.    Genitourinary: Negative.    Musculoskeletal:  Positive for muscle ache.   Skin: Negative.    Neurological:  Positive for headaches.     Objective:      Physical Exam   HENT:   Nose: Right sinus exhibits no maxillary sinus tenderness and no frontal sinus tenderness. Left sinus exhibits no maxillary sinus tenderness and no frontal sinus tenderness.   Mouth/Throat: Posterior oropharyngeal edema present. No posterior oropharyngeal erythema, tonsillar abscesses or cobblestoning.   Cardiovascular:   No murmur heard.Exam reveals no gallop and no friction rub.   Pulmonary/Chest: No stridor. No respiratory distress. He has no wheezes. He has no rhonchi. He " has no rales. He exhibits no tenderness.   Nursing note and vitals reviewed.      Assessment:       1. Exposure to the flu    2. Sore throat          Plan:         Exposure to the flu    Sore throat  -     POCT Influenza A/B MOLECULAR    Other orders  -     oseltamivir (TAMIFLU) 75 MG capsule; Take 1 capsule (75 mg total) by mouth 2 (two) times daily. for 5 days  Dispense: 10 capsule; Refill: 0       Results for orders placed or performed in visit on 12/02/22   POCT Influenza A/B MOLECULAR   Result Value Ref Range    POC Molecular Influenza A Ag Negative Negative, Not Reported    POC Molecular Influenza B Ag Negative Negative, Not Reported     Acceptable Yes                   FLU    If your condition worsens or fails to improve we recommend that you receive another evaluation at the ER immediately or contact your PCP to discuss your concerns or return here. You must understand that you've received an urgent care treatment only and that you may be released before all your medical problems are known or treated. You the patient will arrange for follouwp care as instructed.   -  Take full course of Tamilfu as prescribed  -  Flonase (fluticasone) is a nasal spray which is available over the counter and may help with your symptoms.   -  Zyrtec D, Claritin D or Allegra D can also help with symptoms of congestion and drainage.   -  If you just have drainage you can take plain Zyrtec, Claritin or Allegra   -  Rest and fluids are also important.   -  Tylenol or ibuprofen can also be used as directed for pain unless you have an allergy to them or medical condition such as stomach ulcers, kidney or liver disease or blood thinners etc for which you should not be taking these type of medications.   - Do not share any utensils or share drinks   - Wash hands frequently        [FreeTextEntry1] : Pulmonary Function Tests could  not be performed due to covid 19 precautions\par \par

## 2023-01-20 NOTE — REVIEW OF SYSTEMS
Home [As Noted in HPI] : as noted in HPI [Negative] : Heme/Lymph [de-identified] : Still with some swelling at the area where the cat scratch was on the left hand

## 2023-06-08 ENCOUNTER — APPOINTMENT (OUTPATIENT)
Dept: INTERNAL MEDICINE | Facility: CLINIC | Age: 63
End: 2023-06-08
Payer: MEDICARE

## 2023-06-08 VITALS
BODY MASS INDEX: 23.73 KG/M2 | HEIGHT: 64 IN | TEMPERATURE: 97.5 F | SYSTOLIC BLOOD PRESSURE: 140 MMHG | HEART RATE: 70 BPM | RESPIRATION RATE: 14 BRPM | WEIGHT: 139 LBS | OXYGEN SATURATION: 98 % | DIASTOLIC BLOOD PRESSURE: 80 MMHG

## 2023-06-08 DIAGNOSIS — Z13.1 ENCOUNTER FOR SCREENING FOR DIABETES MELLITUS: ICD-10-CM

## 2023-06-08 PROCEDURE — 99214 OFFICE O/P EST MOD 30 MIN: CPT

## 2023-06-08 RX ORDER — FLUTICASONE FUROATE AND VILANTEROL TRIFENATATE 100; 25 UG/1; UG/1
100-25 POWDER RESPIRATORY (INHALATION) DAILY
Qty: 1 | Refills: 5 | Status: DISCONTINUED | COMMUNITY
Start: 2022-01-24 | End: 2023-06-08

## 2023-06-08 RX ORDER — DICYCLOMINE HYDROCHLORIDE 10 MG/1
10 CAPSULE ORAL 3 TIMES DAILY
Qty: 90 | Refills: 1 | Status: DISCONTINUED | COMMUNITY
Start: 2022-07-13 | End: 2023-06-08

## 2023-06-08 NOTE — HISTORY OF PRESENT ILLNESS
[FreeTextEntry1] : hld, iron def anemia & Elbow pain [de-identified] : -PMH: HLD, COPD, Strong FH of Heart Dz, h/o Pre-Malignant Colon Polyps (7/2021; Last Colonoscopy), IBS, Iron Def Anemia, h/o Erosive esophagitis & gastritis w/ Hiatal Hernia\par -SH: . Employed. Former smoker (Quit 2010). Occasional EtOH use\par \par SHEFALI is a 63 year F whom is here today for f/u hld, iron def anemia & Elbow pain\par Pt reports right elbow pain x 2months. Symptoms improved since onset. Nonradiating pain. worse with movement and better with rest. denies motor wakness or numbness\par \par -Iron Def Anemia: Remains on Ferrous Sulfate 1tab QD. Following w/ GI (Last seen 7/2022). Per GI, capsule endoscopy recommended to complete anemia w/u. Pt to hold iron supplement 1 week prior to study and then restart. Continue Pantoprazole 40 mg daily. F/u lactose tolerance test. F/u w/ GI in office after capsule endoscopy. \par s/p colonoscopy & EGD July 2021. Colonoscopy revealed adenomatous polyps (Repeat colonoscopy five years for surveillance was advised). EGD demonstrated erosive gastritis w/o signs of active bleeding, path negative for barrets or h.pylori. \par \par -HLD: No longer on Atorvastatin & Vascepa 2/2 h/o DILI. Remains on Ezetimibe 10mg QD. No reported changes. \par \par -COPD: Former smoker (Quit 2010). Managed on Breo as per Pulm. Follow w/ Pulm yearly for routine f/u and repeat CT Chest for Lung CA Screening. No other reported changes. \par \par -Osteopenia: (2/2021) DEXA: Osteopenia w/ lowest Tscore of -2.2 left femoral neck. Normal FRAX. Remains on Vit-D. \par \par -She has a h/o SUSI & Drug induced liver Injury 2/2 Doxycycline which is all now resolved

## 2023-06-08 NOTE — ASSESSMENT
[FreeTextEntry1] : -PMH: HLD, COPD, Strong FH of Heart Dz, h/o Pre-Malignant Colon Polyps (7/2021; Last Colonoscopy), IBS, Iron Def Anemia, h/o Erosive esophagitis & gastritis w/ Hiatal Hernia\par -SH: . Employed. Former smoker (Quit 2010). Occasional EtOH use\par \par SHEFALI is a 63 year F whom is here today for f/u hld, iron def anemia \par \par Specialists Involved:\par -OBGYN: Dr. Jameson\par -GI: Dr. Flores Osorio\par -Pulm: Dr. Lance Peña\par -Cardio: Dr. Shayna Pelaez ([phone])\par \par -F/u labs ordered in office today\par -Further recs pending lab results\par -Continue routine f/u w/ Cardio (HLD, Strong FH)\par -Continue routine f/u w/ Pulm (COPD, Lung CA Screening)\par -Continue f/u w/ GI (Iron Def Anemia w/u)\par -Continue routine f/u w/ Gyn (Cervical CA Screening)\par -RTO 3mo forroutine f/u or sooner if needed. \par -RTO 6mo for CPE

## 2023-06-08 NOTE — PHYSICAL EXAM
[Normal] : no joint swelling and grossly normal strength and tone [de-identified] : tenderness over lateral epicondyl

## 2023-06-12 LAB
ALBUMIN SERPL ELPH-MCNC: 4.4 G/DL
ALP BLD-CCNC: 98 U/L
ALT SERPL-CCNC: 28 U/L
ANION GAP SERPL CALC-SCNC: 15 MMOL/L
AST SERPL-CCNC: 27 U/L
BILIRUB SERPL-MCNC: 0.4 MG/DL
BUN SERPL-MCNC: 17 MG/DL
CALCIUM SERPL-MCNC: 10 MG/DL
CHLORIDE SERPL-SCNC: 106 MMOL/L
CHOLEST SERPL-MCNC: 135 MG/DL
CO2 SERPL-SCNC: 22 MMOL/L
CREAT SERPL-MCNC: 0.92 MG/DL
EGFR: 69 ML/MIN/1.73M2
ESTIMATED AVERAGE GLUCOSE: 114 MG/DL
FERRITIN SERPL-MCNC: 450 NG/ML
GLUCOSE SERPL-MCNC: 107 MG/DL
HBA1C MFR BLD HPLC: 5.6 %
HDLC SERPL-MCNC: 58 MG/DL
IRON SATN MFR SERPL: 36 %
IRON SERPL-MCNC: 136 UG/DL
LDLC SERPL CALC-MCNC: 46 MG/DL
NONHDLC SERPL-MCNC: 77 MG/DL
POTASSIUM SERPL-SCNC: 4.3 MMOL/L
PROT SERPL-MCNC: 6.9 G/DL
SODIUM SERPL-SCNC: 143 MMOL/L
TIBC SERPL-MCNC: 379 UG/DL
TRIGL SERPL-MCNC: 158 MG/DL
UIBC SERPL-MCNC: 243 UG/DL

## 2023-09-04 ENCOUNTER — RX RENEWAL (OUTPATIENT)
Age: 63
End: 2023-09-04

## 2023-09-08 ENCOUNTER — APPOINTMENT (OUTPATIENT)
Dept: INTERNAL MEDICINE | Facility: CLINIC | Age: 63
End: 2023-09-08
Payer: MEDICARE

## 2023-09-08 VITALS
WEIGHT: 140 LBS | DIASTOLIC BLOOD PRESSURE: 90 MMHG | SYSTOLIC BLOOD PRESSURE: 120 MMHG | OXYGEN SATURATION: 97 % | BODY MASS INDEX: 23.9 KG/M2 | HEIGHT: 64 IN | TEMPERATURE: 97.3 F | HEART RATE: 76 BPM | RESPIRATION RATE: 14 BRPM

## 2023-09-08 PROCEDURE — 36415 COLL VENOUS BLD VENIPUNCTURE: CPT

## 2023-09-08 PROCEDURE — 99214 OFFICE O/P EST MOD 30 MIN: CPT | Mod: 25

## 2023-09-08 RX ORDER — CHLORHEXIDINE GLUCONATE 4 %
325 (65 FE) LIQUID (ML) TOPICAL DAILY
Qty: 180 | Refills: 0 | Status: DISCONTINUED | COMMUNITY
Start: 2022-06-02 | End: 2023-09-08

## 2023-09-08 RX ORDER — FENOFIBRATE 48 MG/1
48 TABLET ORAL
Refills: 0 | Status: ACTIVE | COMMUNITY
Start: 2023-09-08

## 2023-09-08 NOTE — PHYSICAL EXAM
[Normal] : no joint swelling and grossly normal strength and tone [de-identified] : tenderness over lateral epicondyl

## 2023-09-08 NOTE — HISTORY OF PRESENT ILLNESS
[FreeTextEntry1] : iron def anemia  [de-identified] : -PMH: HLD, COPD, Strong FH of Heart Dz, h/o Pre-Malignant Colon Polyps (7/2021; Last Colonoscopy), IBS, Iron Def Anemia, h/o Erosive esophagitis & gastritis w/ Hiatal Hernia -SH: . Employed. Former smoker (Quit 2010). Occasional EtOH use  SHEFALI is a 63 year F whom is here today for f/u hld, iron def anemia   -Iron Def Anemia: Remains on Ferrous Sulfate 1tab QD. Following w/ GI (Last seen 7/2022). Per GI, capsule endoscopy recommended to complete anemia w/u which returned normal. Continue Pantoprazole 40 mg daily. s/p colonoscopy & EGD July 2021. Colonoscopy revealed adenomatous polyps (Repeat colonoscopy five years for surveillance was advised). EGD demonstrated erosive gastritis w/o signs of active bleeding, path negative for barrets or h.pylori.   -HLD: Remains on Repatha, Fenofibrate & Ezetimibe 10mg QD. No reported changes.   -COPD: Former smoker (Quit 2010). Managed on Breo as per Pulm. Follow w/ Pulm yearly for routine f/u and repeat CT Chest for Lung CA Screening. No other reported changes.   -Osteopenia: (2/2021) DEXA: Osteopenia w/ lowest Tscore of -2.2 left femoral neck. Normal FRAX. Remains on Vit-D.   -She has a h/o SUSI & Drug induced liver Injury 2/2 Doxycycline which is all now resolved

## 2023-09-11 LAB
BASOPHILS # BLD AUTO: 0.04 K/UL
BASOPHILS NFR BLD AUTO: 0.7 %
EOSINOPHIL # BLD AUTO: 0.23 K/UL
EOSINOPHIL NFR BLD AUTO: 4 %
FERRITIN SERPL-MCNC: 498 NG/ML
HCT VFR BLD CALC: 47.4 %
HGB BLD-MCNC: 15.2 G/DL
IMM GRANULOCYTES NFR BLD AUTO: 0.3 %
IRON SATN MFR SERPL: 35 %
IRON SERPL-MCNC: 123 UG/DL
LYMPHOCYTES # BLD AUTO: 0.84 K/UL
LYMPHOCYTES NFR BLD AUTO: 14.6 %
MAN DIFF?: NORMAL
MCHC RBC-ENTMCNC: 30.8 PG
MCHC RBC-ENTMCNC: 32.1 GM/DL
MCV RBC AUTO: 96 FL
MONOCYTES # BLD AUTO: 0.7 K/UL
MONOCYTES NFR BLD AUTO: 12.2 %
NEUTROPHILS # BLD AUTO: 3.93 K/UL
NEUTROPHILS NFR BLD AUTO: 68.2 %
PLATELET # BLD AUTO: 258 K/UL
RBC # BLD: 4.94 M/UL
RBC # FLD: 12.5 %
TIBC SERPL-MCNC: 354 UG/DL
UIBC SERPL-MCNC: 230 UG/DL
WBC # FLD AUTO: 5.76 K/UL

## 2023-09-15 ENCOUNTER — NON-APPOINTMENT (OUTPATIENT)
Age: 63
End: 2023-09-15

## 2023-12-07 ENCOUNTER — APPOINTMENT (OUTPATIENT)
Dept: INTERNAL MEDICINE | Facility: CLINIC | Age: 63
End: 2023-12-07
Payer: MEDICARE

## 2023-12-07 VITALS
WEIGHT: 141 LBS | DIASTOLIC BLOOD PRESSURE: 80 MMHG | TEMPERATURE: 97 F | RESPIRATION RATE: 14 BRPM | HEIGHT: 64 IN | BODY MASS INDEX: 24.07 KG/M2 | SYSTOLIC BLOOD PRESSURE: 140 MMHG | HEART RATE: 84 BPM | OXYGEN SATURATION: 96 %

## 2023-12-07 DIAGNOSIS — K22.2 ESOPHAGEAL OBSTRUCTION: ICD-10-CM

## 2023-12-07 DIAGNOSIS — J44.9 CHRONIC OBSTRUCTIVE PULMONARY DISEASE, UNSPECIFIED: ICD-10-CM

## 2023-12-07 DIAGNOSIS — K29.80 DUODENITIS W/OUT BLEEDING: ICD-10-CM

## 2023-12-07 DIAGNOSIS — D50.8 OTHER IRON DEFICIENCY ANEMIAS: ICD-10-CM

## 2023-12-07 DIAGNOSIS — M85.80 OTHER SPECIFIED DISORDERS OF BONE DENSITY AND STRUCTURE, UNSPECIFIED SITE: ICD-10-CM

## 2023-12-07 DIAGNOSIS — Z87.19 PERSONAL HISTORY OF OTHER DISEASES OF THE DIGESTIVE SYSTEM: ICD-10-CM

## 2023-12-07 PROCEDURE — G0402 INITIAL PREVENTIVE EXAM: CPT

## 2023-12-07 RX ORDER — DICLOFENAC SODIUM 1% 10 MG/G
1 GEL TOPICAL
Qty: 21 | Refills: 0 | Status: DISCONTINUED | COMMUNITY
Start: 2023-06-08 | End: 2023-12-07

## 2023-12-07 RX ORDER — PANTOPRAZOLE 20 MG/1
20 TABLET, DELAYED RELEASE ORAL
Qty: 90 | Refills: 3 | Status: ACTIVE | COMMUNITY
Start: 2021-07-15 | End: 1900-01-01

## 2023-12-08 ENCOUNTER — LABORATORY RESULT (OUTPATIENT)
Age: 63
End: 2023-12-08

## 2023-12-11 LAB
25(OH)D3 SERPL-MCNC: 54.1 NG/ML
ALBUMIN SERPL ELPH-MCNC: 4.3 G/DL
ALP BLD-CCNC: 95 U/L
ALT SERPL-CCNC: 36 U/L
ANION GAP SERPL CALC-SCNC: 14 MMOL/L
AST SERPL-CCNC: 39 U/L
BASOPHILS # BLD AUTO: 0.05 K/UL
BASOPHILS NFR BLD AUTO: 0.8 %
BILIRUB SERPL-MCNC: 0.5 MG/DL
BUN SERPL-MCNC: 15 MG/DL
CALCIUM SERPL-MCNC: 9.6 MG/DL
CHLORIDE SERPL-SCNC: 105 MMOL/L
CHOLEST SERPL-MCNC: 347 MG/DL
CO2 SERPL-SCNC: 23 MMOL/L
CREAT SERPL-MCNC: 0.94 MG/DL
CREAT SPEC-SCNC: 323 MG/DL
EGFR: 67 ML/MIN/1.73M2
EOSINOPHIL # BLD AUTO: 0.18 K/UL
EOSINOPHIL NFR BLD AUTO: 2.8 %
FERRITIN SERPL-MCNC: 574 NG/ML
GLUCOSE SERPL-MCNC: 108 MG/DL
HCT VFR BLD CALC: 44.6 %
HDLC SERPL-MCNC: 60 MG/DL
HGB BLD-MCNC: 14.4 G/DL
IMM GRANULOCYTES NFR BLD AUTO: 0.6 %
IRON SATN MFR SERPL: 28 %
IRON SERPL-MCNC: 100 UG/DL
LDLC SERPL CALC-MCNC: 240 MG/DL
LYMPHOCYTES # BLD AUTO: 0.82 K/UL
LYMPHOCYTES NFR BLD AUTO: 12.8 %
MAN DIFF?: NORMAL
MCHC RBC-ENTMCNC: 30.3 PG
MCHC RBC-ENTMCNC: 32.3 GM/DL
MCV RBC AUTO: 93.9 FL
MICROALBUMIN 24H UR DL<=1MG/L-MCNC: 2.5 MG/DL
MICROALBUMIN/CREAT 24H UR-RTO: 8 MG/G
MONOCYTES # BLD AUTO: 0.62 K/UL
MONOCYTES NFR BLD AUTO: 9.6 %
NEUTROPHILS # BLD AUTO: 4.72 K/UL
NEUTROPHILS NFR BLD AUTO: 73.4 %
NONHDLC SERPL-MCNC: 287 MG/DL
PLATELET # BLD AUTO: 206 K/UL
POTASSIUM SERPL-SCNC: 4 MMOL/L
PROT SERPL-MCNC: 6.9 G/DL
RBC # BLD: 4.75 M/UL
RBC # FLD: 12.8 %
SODIUM SERPL-SCNC: 142 MMOL/L
TIBC SERPL-MCNC: 365 UG/DL
TRIGL SERPL-MCNC: 235 MG/DL
TSH SERPL-ACNC: 5.2 UIU/ML
UIBC SERPL-MCNC: 264 UG/DL
WBC # FLD AUTO: 6.43 K/UL

## 2024-01-23 ENCOUNTER — APPOINTMENT (OUTPATIENT)
Dept: PULMONOLOGY | Facility: CLINIC | Age: 64
End: 2024-01-23
Payer: MEDICARE

## 2024-01-23 VITALS
HEART RATE: 75 BPM | WEIGHT: 140 LBS | HEIGHT: 64 IN | OXYGEN SATURATION: 96 % | SYSTOLIC BLOOD PRESSURE: 132 MMHG | DIASTOLIC BLOOD PRESSURE: 74 MMHG | BODY MASS INDEX: 23.9 KG/M2 | RESPIRATION RATE: 16 BRPM

## 2024-01-23 DIAGNOSIS — Z87.891 PERSONAL HISTORY OF NICOTINE DEPENDENCE: ICD-10-CM

## 2024-01-23 PROCEDURE — 99214 OFFICE O/P EST MOD 30 MIN: CPT

## 2024-01-23 RX ORDER — IPRATROPIUM BROMIDE AND ALBUTEROL 20; 100 UG/1; UG/1
20-100 SPRAY, METERED RESPIRATORY (INHALATION) 4 TIMES DAILY
Qty: 1 | Refills: 5 | Status: ACTIVE | COMMUNITY
Start: 2024-01-23 | End: 1900-01-01

## 2024-01-23 RX ORDER — FLUTICASONE FUROATE AND VILANTEROL TRIFENATATE 100; 25 UG/1; UG/1
100-25 POWDER RESPIRATORY (INHALATION)
Qty: 3 | Refills: 3 | Status: ACTIVE | COMMUNITY
Start: 2022-11-28 | End: 1900-01-01

## 2024-01-23 RX ORDER — EVOLOCUMAB 140 MG/ML
140 INJECTION, SOLUTION SUBCUTANEOUS
Refills: 0 | Status: DISCONTINUED | COMMUNITY
Start: 2023-09-08 | End: 2024-01-23

## 2024-01-23 NOTE — PHYSICAL EXAM
[Normal Oropharynx] : normal oropharynx [No Acute Distress] : no acute distress [Normal Appearance] : normal appearance [No Neck Mass] : no neck mass [Normal Rate/Rhythm] : normal rate/rhythm [Normal S1, S2] : normal s1, s2 [No Murmurs] : no murmurs [No Abnormalities] : no abnormalities [Benign] : benign [Normal Gait] : normal gait [No Clubbing] : no clubbing [No Cyanosis] : no cyanosis [No Edema] : no edema [FROM] : FROM [No Focal Deficits] : no focal deficits [Normal Color/ Pigmentation] : normal color/ pigmentation [Oriented x3] : oriented x3 [Normal Affect] : normal affect [TextBox_68] : Decreased breath sounds bilateral

## 2024-01-23 NOTE — HISTORY OF PRESENT ILLNESS
[Former] : former [>= 20 pack years] : >= 20 pack years [YearQuit] : 2010 [ESS] : 0 [Doing Well] : doing well [Difficulty Breathing During Exertion] : denies dyspnea on exertion [Feelings Of Weakness On Exertion] : denies exercise intolerance [Cough] : denies coughing [Coughing Up Sputum] : denies coughing up sputum [Wheezing] : denies wheezing [Regional Soft Tissue Swelling Both Lower Extremities] : denies lower extremity edema [Wt Gain ___ Lbs] : no recent weight gain [Wt Loss ___ Lbs] : no recent weight loss [Tobacco Use] : ~He/She~ does not use tobacco [Adherent] : the patient is adherent with ~his/her~ medication regimen [FreeTextEntry8] : 10 yrs tobacco free [de-identified] : lung nodules,sinusitis, anemia

## 2024-01-23 NOTE — DISCUSSION/SUMMARY
[COPD] : chronic obstructive pulmonary disease [FreeTextEntry1] : Due to the patient's history of prior tobacco use they fulfill criteria for low dose, lung cancer screening. This method was described to the patient with criteria for greater than 20 pack years of smoking, over the age of 50, and screening for 15 years following cessation of tobacco use or until age 80\par   [Improving] : improving [Responding to Treatment] : responding to treatment [Stage II (Moderate)] : stage II (moderate) [None] : There are no changes in medication management [de-identified] : Deconditioning, CAD

## 2024-01-24 ENCOUNTER — NON-APPOINTMENT (OUTPATIENT)
Age: 64
End: 2024-01-24

## 2024-02-12 ENCOUNTER — NON-APPOINTMENT (OUTPATIENT)
Age: 64
End: 2024-02-12

## 2024-02-13 RX ORDER — ALENDRONATE SODIUM 70 MG/1
70 TABLET ORAL
Qty: 4 | Refills: 3 | Status: ACTIVE | COMMUNITY
Start: 2024-02-12 | End: 1900-01-01

## 2024-06-10 ENCOUNTER — APPOINTMENT (OUTPATIENT)
Dept: INTERNAL MEDICINE | Facility: CLINIC | Age: 64
End: 2024-06-10
Payer: MEDICARE

## 2024-06-10 VITALS
WEIGHT: 133 LBS | SYSTOLIC BLOOD PRESSURE: 130 MMHG | OXYGEN SATURATION: 95 % | HEIGHT: 64 IN | RESPIRATION RATE: 16 BRPM | HEART RATE: 72 BPM | BODY MASS INDEX: 22.71 KG/M2 | DIASTOLIC BLOOD PRESSURE: 81 MMHG | TEMPERATURE: 97.6 F

## 2024-06-10 DIAGNOSIS — E78.5 HYPERLIPIDEMIA, UNSPECIFIED: ICD-10-CM

## 2024-06-10 DIAGNOSIS — K20.80 OTHER ESOPHAGITIS WITHOUT BLEEDING: ICD-10-CM

## 2024-06-10 DIAGNOSIS — R79.89 OTHER SPECIFIED ABNORMAL FINDINGS OF BLOOD CHEMISTRY: ICD-10-CM

## 2024-06-10 DIAGNOSIS — M81.0 AGE-RELATED OSTEOPOROSIS W/OUT CURRENT PATHOLOGICAL FRACTURE: ICD-10-CM

## 2024-06-10 DIAGNOSIS — R03.0 ELEVATED BLOOD-PRESSURE READING, W/OUT DIAGNOSIS OF HYPERTENSION: ICD-10-CM

## 2024-06-10 DIAGNOSIS — Z87.39 PERSONAL HISTORY OF OTHER DISEASES OF THE MUSCULOSKELETAL SYSTEM AND CONNECTIVE TISSUE: ICD-10-CM

## 2024-06-10 DIAGNOSIS — L24.1 IRRITANT CONTACT DERMATITIS DUE TO OILS AND GREASES: ICD-10-CM

## 2024-06-10 DIAGNOSIS — I77.9 DISORDER OF ARTERIES AND ARTERIOLES, UNSPECIFIED: ICD-10-CM

## 2024-06-10 PROCEDURE — 99214 OFFICE O/P EST MOD 30 MIN: CPT

## 2024-06-10 PROCEDURE — G2211 COMPLEX E/M VISIT ADD ON: CPT

## 2024-06-10 PROCEDURE — 36415 COLL VENOUS BLD VENIPUNCTURE: CPT

## 2024-06-10 RX ORDER — SIMVASTATIN 40 MG/1
40 TABLET, FILM COATED ORAL
Refills: 0 | Status: ACTIVE | COMMUNITY
Start: 2024-06-10

## 2024-06-10 RX ORDER — TRIAMCINOLONE ACETONIDE 1 MG/G
0.1 CREAM TOPICAL 3 TIMES DAILY
Qty: 1 | Refills: 0 | Status: ACTIVE | COMMUNITY
Start: 2024-06-10 | End: 1900-01-01

## 2024-06-11 DIAGNOSIS — E03.8 OTHER SPECIFIED HYPOTHYROIDISM: ICD-10-CM

## 2024-06-11 LAB
25(OH)D3 SERPL-MCNC: 62.4 NG/ML
ESTIMATED AVERAGE GLUCOSE: 111 MG/DL
HBA1C MFR BLD HPLC: 5.5 %
TSH SERPL-ACNC: 2.08 UIU/ML

## 2024-06-14 LAB
ALBUMIN SERPL ELPH-MCNC: 4.5 G/DL
ALP BLD-CCNC: 97 U/L
ALT SERPL-CCNC: 33 U/L
ANION GAP SERPL CALC-SCNC: 18 MMOL/L
AST SERPL-CCNC: 41 U/L
BASOPHILS # BLD AUTO: 0.06 K/UL
BASOPHILS NFR BLD AUTO: 0.9 %
BILIRUB SERPL-MCNC: 0.6 MG/DL
BUN SERPL-MCNC: 14 MG/DL
CALCIUM SERPL-MCNC: 9.6 MG/DL
CHLORIDE SERPL-SCNC: 104 MMOL/L
CHOLEST SERPL-MCNC: 239 MG/DL
CO2 SERPL-SCNC: 19 MMOL/L
CREAT SERPL-MCNC: 1 MG/DL
EGFR: 62 ML/MIN/1.73M2
EOSINOPHIL # BLD AUTO: 0.14 K/UL
EOSINOPHIL NFR BLD AUTO: 2 %
FERRITIN SERPL-MCNC: 633 NG/ML
GLUCOSE SERPL-MCNC: 100 MG/DL
HCT VFR BLD CALC: 42.5 %
HDLC SERPL-MCNC: 63 MG/DL
HGB BLD-MCNC: 14 G/DL
IMM GRANULOCYTES NFR BLD AUTO: 0.3 %
IRON SATN MFR SERPL: 20 %
IRON SERPL-MCNC: 75 UG/DL
LDLC SERPL CALC-MCNC: 133 MG/DL
LYMPHOCYTES # BLD AUTO: 0.8 K/UL
LYMPHOCYTES NFR BLD AUTO: 11.5 %
MAN DIFF?: NORMAL
MCHC RBC-ENTMCNC: 31 PG
MCHC RBC-ENTMCNC: 32.9 GM/DL
MCV RBC AUTO: 94 FL
MONOCYTES # BLD AUTO: 0.49 K/UL
MONOCYTES NFR BLD AUTO: 7 %
NEUTROPHILS # BLD AUTO: 5.46 K/UL
NEUTROPHILS NFR BLD AUTO: 78.3 %
NONHDLC SERPL-MCNC: 176 MG/DL
PLATELET # BLD AUTO: 233 K/UL
POTASSIUM SERPL-SCNC: 4.2 MMOL/L
PROT SERPL-MCNC: 7 G/DL
RBC # BLD: 4.52 M/UL
RBC # FLD: 13.7 %
SODIUM SERPL-SCNC: 141 MMOL/L
THYROGLOB AB SERPL-ACNC: <20 IU/ML
THYROPEROXIDASE AB SERPL IA-ACNC: <10 IU/ML
TIBC SERPL-MCNC: 372 UG/DL
TRIGL SERPL-MCNC: 244 MG/DL
UIBC SERPL-MCNC: 298 UG/DL
WBC # FLD AUTO: 6.97 K/UL

## 2024-06-14 NOTE — HISTORY OF PRESENT ILLNESS
[de-identified] : -PMH: HLD, COPD, Strong FH of Heart Dz, h/o Pre-Malignant Colon Polyps (7/2021; Last Colonoscopy), IBS, Iron Def Anemia, h/o Erosive esophagitis & gastritis w/ Hiatal Hernia -SH: . Employed. Former smoker (Quit 2010). Occasional EtOH use  SHEFALI is a 64 year F whom is here today for   -h/o Iron Def Anemia. Following w/ GI. Normal EGD, Capsule Endoscopy. Colonoscopy 8/2022 demonstrated an adenomatous polyp, plan to repeat 5yrs. Remains on Pantoprazole 20 mg daily given prior EGD demonstrating ulceration and shatzki ring.  -HLD: Now on Simvastatin 40mg HS. Remains on Fenofibrate & Ezetimibe 10mg QD.  -CAD: Remains on ASA  -COPD: Former smoker (Quit 2010). Managed on Breo as per Pulm. Follow w/ Pulm yearly for routine f/u and repeat CT Chest for Lung CA Screening. No other reported changes.  -Osteopenia: (1/24/2024) DEXA: Osteoporosis with lowest T-score -2.5 lumbar spine. Declines Fosamax. Remains on Vit-D.  -She has a h/o SUSI & Drug induced liver Injury 2/2 Doxycycline which is all now resolved
None

## 2024-06-14 NOTE — ADDENDUM
[FreeTextEntry1] : labs all good aside the followin. Uncontrolled cholesterol. Much improved but still not at goal. F/u w/ Cardio 2. Slightly elevated AST. Does patient drink and/or consume tylenol?? If so Discontinuation is stronly advised along with a repeat set of LFTs within 1mo.  3. Ferritin persistently elevtated and of unclear cause. Heme consult advised

## 2024-06-24 ENCOUNTER — APPOINTMENT (OUTPATIENT)
Dept: PULMONOLOGY | Facility: CLINIC | Age: 64
End: 2024-06-24

## 2024-10-07 NOTE — ASSESSMENT
[FreeTextEntry1] : -PMH: HLD, COPD, Strong FH of Heart Dz, h/o Pre-Malignant Colon Polyps (7/2021; Last Colonoscopy), IBS, Iron Def Anemia, h/o Erosive esophagitis & gastritis w/ Hiatal Hernia -SH: . Employed. Former smoker (Quit 2010). Occasional EtOH use  SHEFALI is a 63 year F whom is here today for f/u hld, iron def anemia   Specialists Involved: -OBGYN: Dr. Jameson -GI: Dr. Flores Osorio -Pulm: Dr. Lance Peña -Cardio: Dr. Shayna Pelaez ([phone])  -F/u labs ordered in office today -Further recs pending lab results -Continue routine f/u w/ Cardio (HLD, Strong FH) -Continue routine f/u w/ Pulm (COPD, Lung CA Screening) -Continue f/u w/ GI (Iron Def Anemia w/u) -Continue routine f/u w/ Gyn (Cervical CA Screening) -RTO 6mo for CPE No

## 2025-04-08 ENCOUNTER — NON-APPOINTMENT (OUTPATIENT)
Age: 65
End: 2025-04-08

## 2025-04-09 ENCOUNTER — APPOINTMENT (OUTPATIENT)
Dept: INTERNAL MEDICINE | Facility: CLINIC | Age: 65
End: 2025-04-09
Payer: MEDICARE

## 2025-04-09 VITALS
HEIGHT: 64 IN | DIASTOLIC BLOOD PRESSURE: 72 MMHG | TEMPERATURE: 98 F | OXYGEN SATURATION: 95 % | HEART RATE: 84 BPM | SYSTOLIC BLOOD PRESSURE: 118 MMHG | RESPIRATION RATE: 16 BRPM | WEIGHT: 139 LBS | BODY MASS INDEX: 23.73 KG/M2

## 2025-04-09 DIAGNOSIS — L24.1 IRRITANT CONTACT DERMATITIS DUE TO OILS AND GREASES: ICD-10-CM

## 2025-04-09 DIAGNOSIS — E78.5 HYPERLIPIDEMIA, UNSPECIFIED: ICD-10-CM

## 2025-04-09 DIAGNOSIS — I77.9 DISORDER OF ARTERIES AND ARTERIOLES, UNSPECIFIED: ICD-10-CM

## 2025-04-09 DIAGNOSIS — K29.70 GASTRITIS, UNSPECIFIED, W/OUT BLEEDING: ICD-10-CM

## 2025-04-09 DIAGNOSIS — K22.2 ESOPHAGEAL OBSTRUCTION: ICD-10-CM

## 2025-04-09 PROCEDURE — 99214 OFFICE O/P EST MOD 30 MIN: CPT

## 2025-04-09 PROCEDURE — G2211 COMPLEX E/M VISIT ADD ON: CPT
